# Patient Record
Sex: MALE | Race: WHITE | NOT HISPANIC OR LATINO | Employment: FULL TIME | URBAN - METROPOLITAN AREA
[De-identification: names, ages, dates, MRNs, and addresses within clinical notes are randomized per-mention and may not be internally consistent; named-entity substitution may affect disease eponyms.]

---

## 2019-01-07 ENCOUNTER — APPOINTMENT (EMERGENCY)
Dept: RADIOLOGY | Facility: HOSPITAL | Age: 29
End: 2019-01-07
Payer: COMMERCIAL

## 2019-01-07 ENCOUNTER — HOSPITAL ENCOUNTER (EMERGENCY)
Facility: HOSPITAL | Age: 29
Discharge: HOME | End: 2019-01-07
Attending: EMERGENCY MEDICINE
Payer: COMMERCIAL

## 2019-01-07 VITALS
HEART RATE: 101 BPM | TEMPERATURE: 99.1 F | RESPIRATION RATE: 14 BRPM | OXYGEN SATURATION: 99 % | WEIGHT: 245.81 LBS | BODY MASS INDEX: 33.29 KG/M2 | HEIGHT: 72 IN | DIASTOLIC BLOOD PRESSURE: 86 MMHG | SYSTOLIC BLOOD PRESSURE: 155 MMHG

## 2019-01-07 DIAGNOSIS — R07.9 CHEST PAIN: Primary | ICD-10-CM

## 2019-01-07 LAB
ALBUMIN SERPL BCP-MCNC: 4.2 G/DL (ref 3.5–5)
ALP SERPL-CCNC: 79 U/L (ref 46–116)
ALT SERPL W P-5'-P-CCNC: 77 U/L (ref 12–78)
ANION GAP SERPL CALCULATED.3IONS-SCNC: 8 MMOL/L (ref 4–13)
AST SERPL W P-5'-P-CCNC: 31 U/L (ref 5–45)
ATRIAL RATE: 100 BPM
BASOPHILS # BLD AUTO: 0.03 THOUSANDS/ΜL (ref 0–0.1)
BASOPHILS NFR BLD AUTO: 0 % (ref 0–1)
BILIRUB DIRECT SERPL-MCNC: 0.11 MG/DL (ref 0–0.2)
BILIRUB SERPL-MCNC: 0.6 MG/DL (ref 0.2–1)
BUN SERPL-MCNC: 11 MG/DL (ref 5–25)
CALCIUM SERPL-MCNC: 9.4 MG/DL (ref 8.3–10.1)
CHLORIDE SERPL-SCNC: 103 MMOL/L (ref 100–108)
CO2 SERPL-SCNC: 30 MMOL/L (ref 21–32)
CREAT SERPL-MCNC: 1.12 MG/DL (ref 0.6–1.3)
EOSINOPHIL # BLD AUTO: 0.14 THOUSAND/ΜL (ref 0–0.61)
EOSINOPHIL NFR BLD AUTO: 2 % (ref 0–6)
ERYTHROCYTE [DISTWIDTH] IN BLOOD BY AUTOMATED COUNT: 12.2 % (ref 11.6–15.1)
GFR SERPL CREATININE-BSD FRML MDRD: 90 ML/MIN/1.73SQ M
GLUCOSE SERPL-MCNC: 77 MG/DL (ref 65–140)
HCT VFR BLD AUTO: 44.5 % (ref 36.5–49.3)
HGB BLD-MCNC: 15.7 G/DL (ref 12–17)
IMM GRANULOCYTES # BLD AUTO: 0.02 THOUSAND/UL (ref 0–0.2)
IMM GRANULOCYTES NFR BLD AUTO: 0 % (ref 0–2)
LIPASE SERPL-CCNC: 98 U/L (ref 73–393)
LYMPHOCYTES # BLD AUTO: 3.9 THOUSANDS/ΜL (ref 0.6–4.47)
LYMPHOCYTES NFR BLD AUTO: 43 % (ref 14–44)
MCH RBC QN AUTO: 29.1 PG (ref 26.8–34.3)
MCHC RBC AUTO-ENTMCNC: 35.3 G/DL (ref 31.4–37.4)
MCV RBC AUTO: 82 FL (ref 82–98)
MONOCYTES # BLD AUTO: 0.89 THOUSAND/ΜL (ref 0.17–1.22)
MONOCYTES NFR BLD AUTO: 10 % (ref 4–12)
NEUTROPHILS # BLD AUTO: 4.03 THOUSANDS/ΜL (ref 1.85–7.62)
NEUTS SEG NFR BLD AUTO: 45 % (ref 43–75)
NRBC BLD AUTO-RTO: 0 /100 WBCS
P AXIS: 56 DEGREES
PLATELET # BLD AUTO: 266 THOUSANDS/UL (ref 149–390)
PMV BLD AUTO: 9.5 FL (ref 8.9–12.7)
POTASSIUM SERPL-SCNC: 3.7 MMOL/L (ref 3.5–5.3)
PR INTERVAL: 140 MS
PROT SERPL-MCNC: 7.6 G/DL (ref 6.4–8.2)
QRS AXIS: 55 DEGREES
QRSD INTERVAL: 88 MS
QT INTERVAL: 334 MS
QTC INTERVAL: 430 MS
RBC # BLD AUTO: 5.4 MILLION/UL (ref 3.88–5.62)
SODIUM SERPL-SCNC: 141 MMOL/L (ref 136–145)
T WAVE AXIS: 19 DEGREES
TROPONIN I SERPL-MCNC: <0.02 NG/ML
VENTRICULAR RATE: 100 BPM
WBC # BLD AUTO: 9.01 THOUSAND/UL (ref 4.31–10.16)

## 2019-01-07 PROCEDURE — 93005 ELECTROCARDIOGRAM TRACING: CPT

## 2019-01-07 PROCEDURE — 80048 BASIC METABOLIC PNL TOTAL CA: CPT | Performed by: EMERGENCY MEDICINE

## 2019-01-07 PROCEDURE — 83690 ASSAY OF LIPASE: CPT | Performed by: EMERGENCY MEDICINE

## 2019-01-07 PROCEDURE — 80076 HEPATIC FUNCTION PANEL: CPT | Performed by: EMERGENCY MEDICINE

## 2019-01-07 PROCEDURE — 85025 COMPLETE CBC W/AUTO DIFF WBC: CPT | Performed by: EMERGENCY MEDICINE

## 2019-01-07 PROCEDURE — 99285 EMERGENCY DEPT VISIT HI MDM: CPT

## 2019-01-07 PROCEDURE — 84484 ASSAY OF TROPONIN QUANT: CPT | Performed by: EMERGENCY MEDICINE

## 2019-01-07 PROCEDURE — 71046 X-RAY EXAM CHEST 2 VIEWS: CPT

## 2019-01-07 PROCEDURE — 36415 COLL VENOUS BLD VENIPUNCTURE: CPT | Performed by: EMERGENCY MEDICINE

## 2019-01-07 PROCEDURE — 93010 ELECTROCARDIOGRAM REPORT: CPT | Performed by: INTERNAL MEDICINE

## 2019-01-07 RX ORDER — OMEPRAZOLE 20 MG/1
20 CAPSULE, DELAYED RELEASE ORAL EVERY OTHER DAY
COMMUNITY

## 2019-01-08 NOTE — ED PROVIDER NOTES
History  Chief Complaint   Patient presents with    Chest Pain     Pt c/o intermittent dull aching L chest pain pain without radiation since this am, with numbness and tingling to L arm  Denies headache, dizziness, nausea, changes in vision, or diaphoresis  HPI  27-year-old male presents with left-sided chest pain that started at about 630 this morning  States that the pain is constant, non radiating  Did have mild tingling in left hand which resolved  Denies any trauma  No shortness of breath  No history of DVT/PE  No leg swelling no history of cancer  No long car rides  Denies any hypertension, hyperlipidemia, diabetes  Does have a grandfather who had MI in 46s  Prior to Admission Medications   Prescriptions Last Dose Informant Patient Reported? Taking?   omeprazole (PriLOSEC) 20 mg delayed release capsule   Yes Yes   Sig: Take 20 mg by mouth every other day      Facility-Administered Medications: None       Past Medical History:   Diagnosis Date    GERD (gastroesophageal reflux disease)     Lumbar herniated disc     x 3       History reviewed  No pertinent surgical history  History reviewed  No pertinent family history  I have reviewed and agree with the history as documented  Social History   Substance Use Topics    Smoking status: Never Smoker    Smokeless tobacco: Current User     Types: Chew    Alcohol use No        Review of Systems   Constitutional: Negative for chills and fever  HENT: Negative for dental problem and ear pain  Eyes: Negative for pain and redness  Respiratory: Negative for cough and shortness of breath  Cardiovascular: Positive for chest pain  Negative for palpitations  Gastrointestinal: Negative for abdominal pain and nausea  Endocrine: Negative for polydipsia and polyphagia  Genitourinary: Negative for dysuria and frequency  Musculoskeletal: Negative for arthralgias and joint swelling  Skin: Negative for color change and rash     Neurological: Negative for dizziness and headaches  Psychiatric/Behavioral: Negative for behavioral problems and confusion  All other systems reviewed and are negative  Physical Exam  Physical Exam   Constitutional: He is oriented to person, place, and time  He appears well-developed and well-nourished  No distress  HENT:   Head: Atraumatic  Right Ear: External ear normal    Left Ear: External ear normal    Nose: Nose normal    Eyes: Pupils are equal, round, and reactive to light  Conjunctivae and EOM are normal    Neck: Normal range of motion  Neck supple  No JVD present  Cardiovascular: Normal rate, regular rhythm and normal heart sounds  No murmur heard  Pulmonary/Chest: Effort normal and breath sounds normal  No respiratory distress  He has no wheezes  Abdominal: Soft  Bowel sounds are normal  He exhibits no distension  There is no tenderness  Musculoskeletal: Normal range of motion  He exhibits no edema  Neurological: He is alert and oriented to person, place, and time  No cranial nerve deficit  Skin: Skin is warm and dry  Capillary refill takes less than 2 seconds  He is not diaphoretic  Psychiatric: He has a normal mood and affect  His behavior is normal    Nursing note and vitals reviewed        Vital Signs  ED Triage Vitals [01/07/19 2007]   Temperature Pulse Respirations Blood Pressure SpO2   99 1 °F (37 3 °C) 101 14 155/86 99 %      Temp Source Heart Rate Source Patient Position - Orthostatic VS BP Location FiO2 (%)   Oral Monitor Sitting Right arm --      Pain Score       5           Vitals:    01/07/19 2007   BP: 155/86   Pulse: 101   Patient Position - Orthostatic VS: Sitting       Visual Acuity      ED Medications  Medications - No data to display    Diagnostic Studies  Results Reviewed     Procedure Component Value Units Date/Time    Troponin I [[id number]]  (Normal) Collected:  01/07/19 2137    Lab Status:  Final result Specimen:  Blood from Arm, Right Updated:  01/07/19 2203 Troponin I <0 02 ng/mL     Basic metabolic panel [[id number]] Collected:  01/07/19 2014    Lab Status:  Final result Specimen:  Blood from Arm, Right Updated:  01/07/19 2044     Sodium 141 mmol/L      Potassium 3 7 mmol/L      Chloride 103 mmol/L      CO2 30 mmol/L      ANION GAP 8 mmol/L      BUN 11 mg/dL      Creatinine 1 12 mg/dL      Glucose 77 mg/dL      Calcium 9 4 mg/dL      eGFR 90 ml/min/1 73sq m     Narrative:         National Kidney Disease Education Program recommendations are as follows:  GFR calculation is accurate only with a steady state creatinine  Chronic Kidney disease less than 60 ml/min/1 73 sq  meters  Kidney failure less than 15 ml/min/1 73 sq  meters      Hepatic function panel [[id number]]  (Normal) Collected:  01/07/19 2014    Lab Status:  Final result Specimen:  Blood from Arm, Right Updated:  01/07/19 2044     Total Bilirubin 0 60 mg/dL      Bilirubin, Direct 0 11 mg/dL      Alkaline Phosphatase 79 U/L      AST 31 U/L      ALT 77 U/L      Total Protein 7 6 g/dL      Albumin 4 2 g/dL     Lipase [[id number]]  (Normal) Collected:  01/07/19 2014    Lab Status:  Final result Specimen:  Blood from Arm, Right Updated:  01/07/19 2044     Lipase 98 u/L     CBC and differential [[id number]] Collected:  01/07/19 2014    Lab Status:  Final result Specimen:  Blood from Arm, Right Updated:  01/07/19 2020     WBC 9 01 Thousand/uL      RBC 5 40 Million/uL      Hemoglobin 15 7 g/dL      Hematocrit 44 5 %      MCV 82 fL      MCH 29 1 pg      MCHC 35 3 g/dL      RDW 12 2 %      MPV 9 5 fL      Platelets 245 Thousands/uL      nRBC 0 /100 WBCs      Neutrophils Relative 45 %      Immat GRANS % 0 %      Lymphocytes Relative 43 %      Monocytes Relative 10 %      Eosinophils Relative 2 %      Basophils Relative 0 %      Neutrophils Absolute 4 03 Thousands/µL      Immature Grans Absolute 0 02 Thousand/uL      Lymphocytes Absolute 3 90 Thousands/µL      Monocytes Absolute 0 89 Thousand/µL      Eosinophils Absolute 0 14 Thousand/µL      Basophils Absolute 0 03 Thousands/µL                  XR chest 2 views   Final Result by Vijay Byrd MD (01/07 2109)      No acute cardiopulmonary disease  Workstation performed: [id number]                    Procedures  Procedures       Phone Contacts  ED Phone Contact    ED Course         HEART Risk Score      Most Recent Value   History  0 Filed at: 01/07/2019 2240   ECG  0 Filed at: 01/07/2019 2240   Age  0 Filed at: 01/07/2019 2240   Risk Factors  1 Filed at: 01/07/2019 2240   Troponin  0 Filed at: 01/07/2019 2240   Heart Score Risk Calculator   History  0 Filed at: 01/07/2019 2240   ECG  0 Filed at: 01/07/2019 2240   Age  0 Filed at: 01/07/2019 2240   Risk Factors  1 Filed at: 01/07/2019 2240   Troponin  0 Filed at: 01/07/2019 2240   HEART Score  1 Filed at: 01/07/2019 2240   HEART Score  1 Filed at: 01/07/2019 2240                            [facility]  Number of Diagnoses or Management Options  Chest pain:   Diagnosis management comments: 32 yo M presents with constant chest pain for the last 12+ hours, heart score of 1, EKG without ischemia, trop negative, CXR without abnormality, labs otherwise normal, doubt ACS especially with constant pain over the last many hours with negative trop, recommend pcp follow up for stress testing as indicated       CritCare Time    Disposition  Final diagnoses:   Chest pain     Time reflects when diagnosis was documented in both MDM as applicable and the Disposition within this note     Time User Action Codes Description Comment    1/7/2019 10:13 PM Justina Showers Add [R07 9] Chest pain       ED Disposition     ED Disposition Condition Comment    Discharge  [place] discharge to home/self care  Condition at discharge: Good        Follow-up Information     Follow up With Specialties Details Why Contact Info    Ksenia Richardson MD Internal Medicine Schedule an appointment as soon as possible for a visit  [facility]  [address]  [address] [place]  [place] [address] [phone]            Patient's Medications   Discharge Prescriptions    No medications on file     No discharge procedures on file      ED Provider  Electronically Signed by           Alexandre Payne MD  01/07/19 4827

## 2019-01-08 NOTE — ED NOTES
Patient transported to [address] [place] [address] Erlin Yi, [address] [place] [address]  01/07/19 2052

## 2019-01-08 NOTE — DISCHARGE INSTRUCTIONS
Determining the exact cause for all patients with chest pain is extremely difficult in the emergency department  Our primary focus is to rule-out immediate life-threatening diseases  If this cannot be done, the definitive diagnosis frequently needs to be determined over time  Sometimes, specialist are required cardiologists, pulmonologists, gastroenterologists or surgeons  Many times the primary care physcians can determine the cause by following the symptoms over time  Please return immediately to the Emergency Department for fever>101, vomiting, shortness of breath, worsening symptoms, pain with exertion, passing out, intractable pain or any other concerns

## 2021-01-25 ENCOUNTER — TRANSCRIBE ORDERS (OUTPATIENT)
Dept: ADMINISTRATIVE | Facility: HOSPITAL | Age: 31
End: 2021-01-25

## 2021-01-25 DIAGNOSIS — R07.89 CHEST DISCOMFORT: ICD-10-CM

## 2021-01-25 DIAGNOSIS — R20.0 ANESTHESIA OF SKIN: Primary | ICD-10-CM

## 2021-02-11 ENCOUNTER — HOSPITAL ENCOUNTER (OUTPATIENT)
Dept: NON INVASIVE DIAGNOSTICS | Facility: CLINIC | Age: 31
Discharge: HOME | End: 2021-02-11
Payer: COMMERCIAL

## 2021-02-11 DIAGNOSIS — R07.89 CHEST DISCOMFORT: ICD-10-CM

## 2021-02-11 LAB
ARRHY DURING EX: NORMAL
CHEST PAIN STATEMENT: NORMAL
MAX DIASTOLIC BP: 82 MMHG
MAX HEART RATE: 181 BPM
MAX PREDICTED HEART RATE: 191 BPM
MAX. SYSTOLIC BP: 180 MMHG
PROTOCOL NAME: NORMAL
REASON FOR TERMINATION: NORMAL
TARGET HR FORMULA: NORMAL
TEST INDICATION: NORMAL
TIME IN EXERCISE PHASE: NORMAL

## 2021-02-11 PROCEDURE — 93017 CV STRESS TEST TRACING ONLY: CPT

## 2021-02-11 PROCEDURE — 93016 CV STRESS TEST SUPVJ ONLY: CPT | Performed by: INTERNAL MEDICINE

## 2021-02-11 PROCEDURE — 93018 CV STRESS TEST I&R ONLY: CPT | Performed by: INTERNAL MEDICINE

## 2023-06-13 ENCOUNTER — APPOINTMENT (EMERGENCY)
Dept: CT IMAGING | Facility: HOSPITAL | Age: 33
End: 2023-06-13
Payer: COMMERCIAL

## 2023-06-13 ENCOUNTER — HOSPITAL ENCOUNTER (EMERGENCY)
Facility: HOSPITAL | Age: 33
Discharge: HOME | End: 2023-06-13
Attending: EMERGENCY MEDICINE
Payer: COMMERCIAL

## 2023-06-13 VITALS
RESPIRATION RATE: 16 BRPM | WEIGHT: 240.96 LBS | HEART RATE: 103 BPM | OXYGEN SATURATION: 96 % | TEMPERATURE: 97.4 F | BODY MASS INDEX: 32.68 KG/M2 | SYSTOLIC BLOOD PRESSURE: 136 MMHG | DIASTOLIC BLOOD PRESSURE: 89 MMHG

## 2023-06-13 DIAGNOSIS — K52.9 GASTROENTERITIS: Primary | ICD-10-CM

## 2023-06-13 LAB
ALBUMIN SERPL BCP-MCNC: 5.5 G/DL (ref 3.5–5)
ALP SERPL-CCNC: 73 U/L (ref 34–104)
ALT SERPL W P-5'-P-CCNC: 66 U/L (ref 7–52)
ANION GAP SERPL CALCULATED.3IONS-SCNC: 16 MMOL/L (ref 4–13)
AST SERPL W P-5'-P-CCNC: 34 U/L (ref 13–39)
BASOPHILS # BLD AUTO: 0.03 THOUSANDS/ÂΜL (ref 0–0.1)
BASOPHILS NFR BLD AUTO: 0 % (ref 0–1)
BILIRUB SERPL-MCNC: 1.22 MG/DL (ref 0.2–1)
BUN SERPL-MCNC: 26 MG/DL (ref 5–25)
CALCIUM SERPL-MCNC: 10.1 MG/DL (ref 8.4–10.2)
CHLORIDE SERPL-SCNC: 98 MMOL/L (ref 96–108)
CO2 SERPL-SCNC: 20 MMOL/L (ref 21–32)
CREAT SERPL-MCNC: 1.33 MG/DL (ref 0.6–1.3)
EOSINOPHIL # BLD AUTO: 0.02 THOUSAND/ÂΜL (ref 0–0.61)
EOSINOPHIL NFR BLD AUTO: 0 % (ref 0–6)
ERYTHROCYTE [DISTWIDTH] IN BLOOD BY AUTOMATED COUNT: 12.2 % (ref 11.6–15.1)
GFR SERPL CREATININE-BSD FRML MDRD: 70 ML/MIN/1.73SQ M
GLUCOSE SERPL-MCNC: 105 MG/DL (ref 65–140)
HCT VFR BLD AUTO: 48 % (ref 36.5–49.3)
HGB BLD-MCNC: 17.4 G/DL (ref 12–17)
IMM GRANULOCYTES # BLD AUTO: 0.05 THOUSAND/UL (ref 0–0.2)
IMM GRANULOCYTES NFR BLD AUTO: 1 % (ref 0–2)
LIPASE SERPL-CCNC: 14 U/L (ref 11–82)
LYMPHOCYTES # BLD AUTO: 1.22 THOUSANDS/ÂΜL (ref 0.6–4.47)
LYMPHOCYTES NFR BLD AUTO: 13 % (ref 14–44)
MCH RBC QN AUTO: 29.6 PG (ref 26.8–34.3)
MCHC RBC AUTO-ENTMCNC: 36.3 G/DL (ref 31.4–37.4)
MCV RBC AUTO: 82 FL (ref 82–98)
MONOCYTES # BLD AUTO: 0.88 THOUSAND/ÂΜL (ref 0.17–1.22)
MONOCYTES NFR BLD AUTO: 10 % (ref 4–12)
NEUTROPHILS # BLD AUTO: 6.93 THOUSANDS/ÂΜL (ref 1.85–7.62)
NEUTS SEG NFR BLD AUTO: 76 % (ref 43–75)
NRBC BLD AUTO-RTO: 0 /100 WBCS
PLATELET # BLD AUTO: 274 THOUSANDS/UL (ref 149–390)
PMV BLD AUTO: 10 FL (ref 8.9–12.7)
POTASSIUM SERPL-SCNC: 3.6 MMOL/L (ref 3.5–5.3)
PROT SERPL-MCNC: 8.8 G/DL (ref 6.4–8.4)
RBC # BLD AUTO: 5.87 MILLION/UL (ref 3.88–5.62)
SODIUM SERPL-SCNC: 134 MMOL/L (ref 135–147)
WBC # BLD AUTO: 9.13 THOUSAND/UL (ref 4.31–10.16)

## 2023-06-13 PROCEDURE — 85025 COMPLETE CBC W/AUTO DIFF WBC: CPT

## 2023-06-13 PROCEDURE — 83690 ASSAY OF LIPASE: CPT

## 2023-06-13 PROCEDURE — 74177 CT ABD & PELVIS W/CONTRAST: CPT

## 2023-06-13 PROCEDURE — 36415 COLL VENOUS BLD VENIPUNCTURE: CPT

## 2023-06-13 PROCEDURE — 80053 COMPREHEN METABOLIC PANEL: CPT

## 2023-06-13 RX ORDER — DICYCLOMINE HCL 20 MG
20 TABLET ORAL ONCE
Status: COMPLETED | OUTPATIENT
Start: 2023-06-13 | End: 2023-06-13

## 2023-06-13 RX ORDER — ONDANSETRON 4 MG/1
4 TABLET, ORALLY DISINTEGRATING ORAL EVERY 6 HOURS PRN
Qty: 20 TABLET | Refills: 0 | Status: SHIPPED | OUTPATIENT
Start: 2023-06-13

## 2023-06-13 RX ORDER — ONDANSETRON 2 MG/ML
4 INJECTION INTRAMUSCULAR; INTRAVENOUS ONCE
Status: COMPLETED | OUTPATIENT
Start: 2023-06-13 | End: 2023-06-13

## 2023-06-13 RX ORDER — DICYCLOMINE HCL 20 MG
20 TABLET ORAL 2 TIMES DAILY
Qty: 20 TABLET | Refills: 0 | Status: SHIPPED | OUTPATIENT
Start: 2023-06-13

## 2023-06-13 RX ADMIN — DICYCLOMINE HYDROCHLORIDE 20 MG: 20 TABLET ORAL at 11:38

## 2023-06-13 RX ADMIN — ONDANSETRON 4 MG: 2 INJECTION INTRAMUSCULAR; INTRAVENOUS at 11:06

## 2023-06-13 RX ADMIN — SODIUM CHLORIDE 1000 ML: 0.9 INJECTION, SOLUTION INTRAVENOUS at 11:25

## 2023-06-13 RX ADMIN — IOHEXOL 100 ML: 350 INJECTION, SOLUTION INTRAVENOUS at 11:53

## 2023-06-13 NOTE — ED PROVIDER NOTES
History  Chief Complaint   Patient presents with   • Abdominal Pain     Pt c/o abd pain , vomiting and diarrhea since 0300 monday morning; pt also c/o body aches  Pt states having  hot sauce that night before  60-year-old male presents to the ER for evaluation of abdominal pain  Patient stated that on Monday morning around 3 AM he was awoken suddenly with the urge to have a bowel movement and vomit  For the last 2 days he has been having multiple episodes of vomiting, nonbloody nonbilious  Diarrhea is described as brown and loose, nonbloody  Abdominal pain is diffuse and described as cramping and aching  Pain is a 6-7 out of 10 pain  Patient has unable to keep food or liquids down  Patient reports having eaten  hot sauce around 7 PM the night before  Hot sauce does not have preservatives and is a natural homemade hot sauce  Patient denies trauma, sick contacts, previous abdominal surgeries, recent travel  Patient also reports body aches, weakness, fatigue  Denies chest pain, shortness of breath, urinary symptoms, flank pain, rash or fever  History provided by:  Patient  Abdominal Pain  Pain location:  Generalized  Pain quality: aching and cramping    Pain radiates to:  Does not radiate  Pain severity:  Moderate  Duration:  2 days  Timing:  Constant  Progression:  Unchanged  Context: suspicious food intake    Context: not previous surgeries, not recent travel, not sick contacts and not trauma    Associated symptoms: diarrhea, nausea and vomiting    Associated symptoms: no chest pain, no chills, no cough, no dysuria, no fever and no shortness of breath    Risk factors: has not had multiple surgeries        Prior to Admission Medications   Prescriptions Last Dose Informant Patient Reported?  Taking?   omeprazole (PriLOSEC) 20 mg delayed release capsule   Yes No   Sig: Take 20 mg by mouth every other day      Facility-Administered Medications: None       Past Medical History: Diagnosis Date   • GERD (gastroesophageal reflux disease)    • Hypertension    • Lumbar herniated disc     x 3       History reviewed  No pertinent surgical history  History reviewed  No pertinent family history  I have reviewed and agree with the history as documented  E-Cigarette/Vaping     E-Cigarette/Vaping Substances     Social History     Tobacco Use   • Smoking status: Never   • Smokeless tobacco: Current     Types: Chew   Substance Use Topics   • Alcohol use: No   • Drug use: No       Review of Systems   Constitutional: Negative for chills and fever  Respiratory: Negative for cough, shortness of breath and wheezing  Cardiovascular: Negative for chest pain and palpitations  Gastrointestinal: Positive for abdominal pain, diarrhea, nausea and vomiting  Genitourinary: Negative for dysuria  Musculoskeletal: Positive for myalgias  Skin: Negative for rash and wound  Neurological: Negative for dizziness, tremors, seizures, syncope, numbness and headaches  Physical Exam  Physical Exam  Vitals and nursing note reviewed  Constitutional:       General: He is not in acute distress  Appearance: He is well-developed  HENT:      Head: Normocephalic and atraumatic  Eyes:      Conjunctiva/sclera: Conjunctivae normal    Cardiovascular:      Rate and Rhythm: Normal rate and regular rhythm  Heart sounds: Normal heart sounds  No murmur heard  Pulmonary:      Effort: Pulmonary effort is normal  No respiratory distress  Breath sounds: Normal breath sounds  Abdominal:      General: Abdomen is flat  Bowel sounds are normal       Palpations: Abdomen is soft  Tenderness: There is no abdominal tenderness  Hernia: No hernia is present  Musculoskeletal:         General: No swelling  Cervical back: Neck supple  Skin:     General: Skin is warm and dry  Capillary Refill: Capillary refill takes less than 2 seconds     Neurological:      Mental Status: He is alert and oriented to person, place, and time     Psychiatric:         Mood and Affect: Mood normal          Vital Signs  ED Triage Vitals [06/13/23 1047]   Temperature Pulse Respirations Blood Pressure SpO2   (!) 97 4 °F (36 3 °C) 98 16 125/87 97 %      Temp Source Heart Rate Source Patient Position - Orthostatic VS BP Location FiO2 (%)   Oral Monitor Sitting Right arm --      Pain Score       --           Vitals:    06/13/23 1047 06/13/23 1230   BP: 125/87 136/89   Pulse: 98 103   Patient Position - Orthostatic VS: Sitting Sitting         Visual Acuity      ED Medications  Medications   ondansetron (ZOFRAN) injection 4 mg (4 mg Intravenous Given 6/13/23 1106)   sodium chloride 0 9 % bolus 1,000 mL (0 mL Intravenous Stopped 6/13/23 1225)   dicyclomine (BENTYL) tablet 20 mg (20 mg Oral Given 6/13/23 1138)   iohexol (OMNIPAQUE) 350 MG/ML injection (SINGLE-DOSE) 100 mL (100 mL Intravenous Given 6/13/23 1153)       Diagnostic Studies  Results Reviewed     Procedure Component Value Units Date/Time    CMP [[id number]]  (Abnormal) Collected: 06/13/23 1106    Lab Status: Final result Specimen: Blood from Arm, Right Updated: 06/13/23 1144     Sodium 134 mmol/L      Potassium 3 6 mmol/L      Chloride 98 mmol/L      CO2 20 mmol/L      ANION GAP 16 mmol/L      BUN 26 mg/dL      Creatinine 1 33 mg/dL      Glucose 105 mg/dL      Calcium 10 1 mg/dL      AST 34 U/L      ALT 66 U/L      Alkaline Phosphatase 73 U/L      Total Protein 8 8 g/dL      Albumin 5 5 g/dL      Total Bilirubin 1 22 mg/dL      eGFR 70 ml/min/1 73sq m     Narrative:      Cari guidelines for Chronic Kidney Disease (CKD):   •  Stage 1 with normal or high GFR (GFR > 90 mL/min/1 73 square meters)  •  Stage 2 Mild CKD (GFR = 60-89 mL/min/1 73 square meters)  •  Stage 3A Moderate CKD (GFR = 45-59 mL/min/1 73 square meters)  •  Stage 3B Moderate CKD (GFR = 30-44 mL/min/1 73 square meters)  •  Stage 4 Severe CKD (GFR = 15-29 mL/min/1 73 square meters)  •  Stage 5 End Stage CKD (GFR <15 mL/min/1 73 square meters)  Note: GFR calculation is accurate only with a steady state creatinine    Lipase [[id number]]  (Normal) Collected: 06/13/23 1106    Lab Status: Final result Specimen: Blood from Arm, Right Updated: 06/13/23 1144     Lipase 14 u/L     CBC and differential [[id number]]  (Abnormal) Collected: 06/13/23 1106    Lab Status: Final result Specimen: Blood from Arm, Right Updated: 06/13/23 1122     WBC 9 13 Thousand/uL      RBC 5 87 Million/uL      Hemoglobin 17 4 g/dL      Hematocrit 48 0 %      MCV 82 fL      MCH 29 6 pg      MCHC 36 3 g/dL      RDW 12 2 %      MPV 10 0 fL      Platelets 449 Thousands/uL      nRBC 0 /100 WBCs      Neutrophils Relative 76 %      Immat GRANS % 1 %      Lymphocytes Relative 13 %      Monocytes Relative 10 %      Eosinophils Relative 0 %      Basophils Relative 0 %      Neutrophils Absolute 6 93 Thousands/µL      Immature Grans Absolute 0 05 Thousand/uL      Lymphocytes Absolute 1 22 Thousands/µL      Monocytes Absolute 0 88 Thousand/µL      Eosinophils Absolute 0 02 Thousand/µL      Basophils Absolute 0 03 Thousands/µL     UA (URINE) with reflex to Scope [[id number]]     Lab Status: No result Specimen: Urine                  CT abdomen pelvis with contrast   Final Result by Kelesy De La Fuente DO (06/13 1322)      1  Multiple fluid-filled loops of small bowel with mild wall thickening suggestive of gastroenteritis  There is also fluid seen within several portions of the colonic lumen suggestive of diarrheal illness  Otherwise, no CT evidence of acute process    within the abdomen or pelvis  Workstation performed: [id number]                    Procedures  Procedures         ED Course                               SBIRT 22yo+    Flowsheet Row Most Recent Value   Initial Alcohol Screen: US AUDIT-C     1  How often do you have a drink containing alcohol? 1 Filed at: 06/13/2023 1250   2   How many drinks containing alcohol do you have on a typical day you are drinking? 0 Filed at: 06/13/2023 1250   3a  Male UNDER 65: How often do you have five or more drinks on one occasion? 0 Filed at: 06/13/2023 1250   3b  FEMALE Any Age, or MALE 65+: How often do you have 4 or more drinks on one occassion? 0 Filed at: 06/13/2023 1250   Audit-C Score 1 Filed at: 06/13/2023 1250   JOSEFINA: How many times in the past year have you    Used an illegal drug or used a prescription medication for non-medical reasons? Never Filed at: 06/13/2023 1250                    Medical Decision Making  80-year-old male presents to the ER for evaluation of abdominal pain  This patient presents with nausea, vomiting, diarrhea differentials include possible acute gastroenteritis  Abdominal exam without peritoneal signs  Doubt invasive bacterial causing diarrhea such as C  Diff-no recent antibiotics, Shiga toxin-nonbloody  No recent travel  Patient is not immunocompromise  Diarrhea is nonbloody so less likely inflammatory bowel disease  No evidence of surgical abdomen or acute medical emergency including bowel obstruction, perforation, atypical appendicitis, acute cholecystiti or diverticulitis  Patient given Bentyl and Zofran with fluids and had pain relief  Prescription sent to pharmacy for Bentyl and Zofran  Advised patient to increase fluid intake  Referral to GI given follow-up with primary care provider  Return to the ER if symptoms worsen or questions or concerns arise at home  Amount and/or Complexity of Data Reviewed  Labs: ordered  Radiology: ordered  Risk  Prescription drug management            Disposition  Final diagnoses:   Gastroenteritis     Time reflects when diagnosis was documented in both MDM as applicable and the Disposition within this note     Time User Action Codes Description Comment    6/13/2023  1:38 PM Isaiah Ruggiero Add [K52 9] Gastroenteritis       ED Disposition     ED Disposition   Discharge    Condition   Stable Date/Time   Tue Jun 13, 2023  1:38 PM    Comment   Jimi Giron discharge to home/self care  Follow-up Information     Follow up With Specialties Details Why Contact Info Additional Information    Moshe Carreon MD Internal Medicine   [address] [facility]  [address]  [address] [place] [address]  [address] [facility]   [address] [phone]  Javier Alonsochano [address] [facility], [address] [facility], [facility], [place], [place], [address]           Discharge Medication List as of 6/13/2023  1:40 PM      START taking these medications    Details   dicyclomine (BENTYL) 20 mg tablet Take 1 tablet (20 mg total) by mouth 2 (two) times a day, Starting Tue 6/13/2023, Normal      ondansetron (ZOFRAN-ODT) 4 mg disintegrating tablet Take 1 tablet (4 mg total) by mouth every 6 (six) hours as needed for nausea or vomiting, Starting Tue 6/13/2023, Normal         CONTINUE these medications which have NOT CHANGED    Details   omeprazole (PriLOSEC) 20 mg delayed release capsule Take 20 mg by mouth every other day, Historical Med             No discharge procedures on file      PDMP Review     None          ED Provider  Electronically Signed by           EVANS Garcia  06/13/23 6043

## 2023-06-13 NOTE — Clinical Note
Santana Ponce was seen and treated in our emergency department on 6/13/2023  Diagnosis:     Israel Rod  may return to work on return date  He may return on this date: 06/15/2023         If you have any questions or concerns, please don't hesitate to call        Tereso Nation    ______________________________           _______________          _______________  Hospital Representative                              Date                                Time

## 2023-06-13 NOTE — ED ATTENDING ATTESTATION
2023  IKarla DO, saw and evaluated the patient  I have discussed the patient with the resident/non-physician practitioner and agree with the resident's/non-physician practitioner's findings, Plan of Care, and MDM as documented in the resident's/non-physician practitioner's note, except where noted  All available labs and Radiology studies were reviewed  I was present for key portions of any procedure(s) performed by the resident/non-physician practitioner and I was immediately available to provide assistance  At this point I agree with the current assessment done in the Emergency Department  I have conducted an independent evaluation of this patient a history and physical is as follows:    ED Course     32 y/o male c/o n/v/d x 1 day  Woke him up from sleeping at 3a  nonbloody diarrhea- multiple episodes  Multiple nbnb emesis  Diffuse abd pain- cramping/ dull  Has not tried anything  Thinks he may have food poisoning from  hot sauce  No one else with similar  No other complaints  General: VSS, NAD, awake, alert  Well-nourished, well-developed  Appears stated age  Speaking normally in full sentences  Head: Normocephalic, atraumatic, nontender  Eyes: PERRL, EOM-I  No diplopia  No hyphema  No subconjunctival hemorrhages  Symmetrical lids  ENT: Atraumatic external nose and ears  MMM  No malocclusion  No stridor  Normal phonation  No drooling  Normal swallowing  Neck: Symmetric, trachea midline  No JVD  CV: RRR  +S1/S2  No murmurs or gallops  Peripheral pulses +2 throughout  No chest wall tenderness  Lungs:   Unlabored No retractions  CTAB, lungs sounds equal bilateral    No tachypnea  Abd: +BS, soft, NT/ND    MSK:   FROM   Back:   No rashes  Skin: Dry, intact  Neuro: AAOx3, GCS 15, CN II-XII grossly intact  Motor grossly intact    Psychiatric/Behavioral: Appropriate mood and affect   Exam: deferred  Plan: labs, ct scan, zofran, bentyl, fluids     Critical Care Time  Procedures

## 2023-07-14 ENCOUNTER — HOSPITAL ENCOUNTER (EMERGENCY)
Facility: HOSPITAL | Age: 33
Discharge: HOME | End: 2023-07-15
Attending: EMERGENCY MEDICINE
Payer: OTHER MISCELLANEOUS

## 2023-07-14 ENCOUNTER — APPOINTMENT (EMERGENCY)
Dept: RADIOLOGY | Facility: HOSPITAL | Age: 33
End: 2023-07-14
Payer: OTHER MISCELLANEOUS

## 2023-07-14 VITALS
DIASTOLIC BLOOD PRESSURE: 78 MMHG | RESPIRATION RATE: 20 BRPM | OXYGEN SATURATION: 99 % | SYSTOLIC BLOOD PRESSURE: 132 MMHG | HEART RATE: 99 BPM | TEMPERATURE: 98.3 F

## 2023-07-14 DIAGNOSIS — R42 LIGHTHEADEDNESS: Primary | ICD-10-CM

## 2023-07-14 PROCEDURE — 80053 COMPREHEN METABOLIC PANEL: CPT | Performed by: EMERGENCY MEDICINE

## 2023-07-14 PROCEDURE — 85025 COMPLETE CBC W/AUTO DIFF WBC: CPT | Performed by: EMERGENCY MEDICINE

## 2023-07-14 PROCEDURE — 93005 ELECTROCARDIOGRAM TRACING: CPT

## 2023-07-14 PROCEDURE — 36415 COLL VENOUS BLD VENIPUNCTURE: CPT

## 2023-07-14 PROCEDURE — 71045 X-RAY EXAM CHEST 1 VIEW: CPT

## 2023-07-14 PROCEDURE — 84484 ASSAY OF TROPONIN QUANT: CPT | Performed by: EMERGENCY MEDICINE

## 2023-07-14 PROCEDURE — 99284 EMERGENCY DEPT VISIT MOD MDM: CPT

## 2023-07-15 LAB
ALBUMIN SERPL BCP-MCNC: 4.6 G/DL (ref 3.5–5)
ALP SERPL-CCNC: 80 U/L (ref 34–104)
ALT SERPL W P-5'-P-CCNC: 50 U/L (ref 7–52)
ANION GAP SERPL CALCULATED.3IONS-SCNC: 10 MMOL/L
AST SERPL W P-5'-P-CCNC: 35 U/L (ref 13–39)
BASOPHILS # BLD AUTO: 0.03 THOUSANDS/ÂΜL (ref 0–0.1)
BASOPHILS NFR BLD AUTO: 0 % (ref 0–1)
BILIRUB SERPL-MCNC: 0.46 MG/DL (ref 0.2–1)
BUN SERPL-MCNC: 20 MG/DL (ref 5–25)
CALCIUM SERPL-MCNC: 9.4 MG/DL (ref 8.4–10.2)
CARDIAC TROPONIN I PNL SERPL HS: 3 NG/L
CHLORIDE SERPL-SCNC: 107 MMOL/L (ref 96–108)
CO2 SERPL-SCNC: 20 MMOL/L (ref 21–32)
CREAT SERPL-MCNC: 1.03 MG/DL (ref 0.6–1.3)
EOSINOPHIL # BLD AUTO: 0.12 THOUSAND/ÂΜL (ref 0–0.61)
EOSINOPHIL NFR BLD AUTO: 1 % (ref 0–6)
ERYTHROCYTE [DISTWIDTH] IN BLOOD BY AUTOMATED COUNT: 12.6 % (ref 11.6–15.1)
GAS + CO PNL BLDA: 1.1 % (ref 0–1.5)
GFR SERPL CREATININE-BSD FRML MDRD: 96 ML/MIN/1.73SQ M
GLUCOSE SERPL-MCNC: 135 MG/DL (ref 65–140)
HCT VFR BLD AUTO: 41.1 % (ref 36.5–49.3)
HGB BLD-MCNC: 14.8 G/DL (ref 12–17)
IMM GRANULOCYTES # BLD AUTO: 0.05 THOUSAND/UL (ref 0–0.2)
IMM GRANULOCYTES NFR BLD AUTO: 1 % (ref 0–2)
LYMPHOCYTES # BLD AUTO: 2.8 THOUSANDS/ÂΜL (ref 0.6–4.47)
LYMPHOCYTES NFR BLD AUTO: 29 % (ref 14–44)
MCH RBC QN AUTO: 30.2 PG (ref 26.8–34.3)
MCHC RBC AUTO-ENTMCNC: 36 G/DL (ref 31.4–37.4)
MCV RBC AUTO: 84 FL (ref 82–98)
MONOCYTES # BLD AUTO: 0.57 THOUSAND/ÂΜL (ref 0.17–1.22)
MONOCYTES NFR BLD AUTO: 6 % (ref 4–12)
NEUTROPHILS # BLD AUTO: 6.18 THOUSANDS/ÂΜL (ref 1.85–7.62)
NEUTS SEG NFR BLD AUTO: 63 % (ref 43–75)
NRBC BLD AUTO-RTO: 0 /100 WBCS
PLATELET # BLD AUTO: 251 THOUSANDS/UL (ref 149–390)
PMV BLD AUTO: 10.5 FL (ref 8.9–12.7)
POTASSIUM SERPL-SCNC: 4.7 MMOL/L (ref 3.5–5.3)
PROT SERPL-MCNC: 6.9 G/DL (ref 6.4–8.4)
RBC # BLD AUTO: 4.9 MILLION/UL (ref 3.88–5.62)
SODIUM SERPL-SCNC: 137 MMOL/L (ref 135–147)
WBC # BLD AUTO: 9.75 THOUSAND/UL (ref 4.31–10.16)

## 2023-07-15 PROCEDURE — 36415 COLL VENOUS BLD VENIPUNCTURE: CPT | Performed by: EMERGENCY MEDICINE

## 2023-07-15 PROCEDURE — 82375 ASSAY CARBOXYHB QUANT: CPT | Performed by: EMERGENCY MEDICINE

## 2023-07-15 NOTE — ED PROVIDER NOTES
History  Chief Complaint   Patient presents with   • Dizziness     Patient arrived via EMS, patient is a  and went to a call, pt was on top of a roof handling metal and other materials. Patient states after he got off he felt dizzy, vision felt "focused" and was seeing "black" tunneled vision. Patient received 700 CC saline during transit. . EMS reports systolic was around 98 before fluids. Mao Galarza) Mao Galarza is a 33 y.o. male who identifies as male    They presented to the emergency department on July 15, 2023. Patient presents with:  Dizziness: Patient arrived via EMS, patient is a  and went to a call, pt was on top of a roof handling metal and other materials. Patient states after he got off he felt dizzy, vision felt "focused" and was seeing "black" tunneled vision. Patient received 700 CC saline during transit. . EMS reports systolic was around 98 before fluids. The patient states that he works as a , went to a call at [employer], was wearing his respirator the entire time, however had to cut vent holes on the roof. Patient states that there were multiple layers of rubber, insulation, as well as metal that he was able to eventually complete and went back to the truck. While standing there he initially felt tingling sensation in his face. Patient notes that he has had this sensation previously when he feels as if he is exerting himself and not drinking of water. Patient states however that the tingling became overall numbness in his face then he developed tunnel vision, as well as muffled hearing. Patient called for nearby paramedic who help take him into a stretcher, and at that time noted that his blood pressure was low. Patient was given some fluids at that time, and was brought to the emergency department for evaluation.   Patient states that this episode lasted roughly about 10 minutes, and after they started fluids he began feeling much better. Patient did not have any focal weakness, difficulty speaking, palpitations, chest pain, shortness of breath, abdominal pain, nausea, vomiting, change in bowel habits, change in urination, loss of consciousness, or any other complaint at this time. Allergies include:  -- Penicillins -- Hives      Immunizations: There is no immunization history on file for this patient. Immunizations Reviewed. Prior to Admission Medications   Prescriptions Last Dose Informant Patient Reported? Taking?   dicyclomine (BENTYL) 20 mg tablet   No No   Sig: Take 1 tablet (20 mg total) by mouth 2 (two) times a day   omeprazole (PriLOSEC) 20 mg delayed release capsule   Yes No   Sig: Take 20 mg by mouth every other day   ondansetron (ZOFRAN-ODT) 4 mg disintegrating tablet   No No   Sig: Take 1 tablet (4 mg total) by mouth every 6 (six) hours as needed for nausea or vomiting      Facility-Administered Medications: None       Past Medical History:   Diagnosis Date   • GERD (gastroesophageal reflux disease)    • Hypertension    • Lumbar herniated disc     x 3       No past surgical history on file. No family history on file. I have reviewed and agree with the history as documented. E-Cigarette/Vaping     E-Cigarette/Vaping Substances     Social History     Tobacco Use   • Smoking status: Never   • Smokeless tobacco: Current     Types: Chew   Substance Use Topics   • Alcohol use: No   • Drug use: No        Review of Systems   Constitutional: Negative for chills and fever. HENT: Negative for ear pain and sore throat. Eyes: Positive for visual disturbance. Negative for pain. Respiratory: Negative for cough and shortness of breath. Cardiovascular: Negative for chest pain and palpitations. Gastrointestinal: Negative for abdominal pain and vomiting. Genitourinary: Negative for dysuria and hematuria. Musculoskeletal: Negative for arthralgias and back pain.    Skin: Negative for color change and rash. Neurological: Positive for light-headedness and numbness. Negative for seizures and syncope. All other systems reviewed and are negative. Physical Exam  ED Triage Vitals [07/14/23 2338]   Temperature Pulse Respirations Blood Pressure SpO2   98.3 °F (36.8 °C) 99 20 132/78 99 %      Temp Source Heart Rate Source Patient Position - Orthostatic VS BP Location FiO2 (%)   Oral Monitor Sitting Left arm --      Pain Score       No Pain             Orthostatic Vital Signs  Vitals:    07/14/23 2338   BP: 132/78   Pulse: 99   Patient Position - Orthostatic VS: Sitting       Physical Exam  Vitals and nursing note reviewed. Constitutional:       General: He is not in acute distress. Appearance: Normal appearance. HENT:      Head: Normocephalic and atraumatic. Right Ear: External ear normal.      Left Ear: External ear normal.      Nose: Nose normal.      Mouth/Throat:      Mouth: Mucous membranes are moist.   Eyes:      Conjunctiva/sclera: Conjunctivae normal.   Cardiovascular:      Rate and Rhythm: Regular rhythm. Tachycardia present. Comments: Heart rate of 101  Pulmonary:      Effort: Pulmonary effort is normal. No respiratory distress. Breath sounds: Normal breath sounds. Abdominal:      General: Abdomen is flat. Bowel sounds are normal.      Tenderness: There is no abdominal tenderness. There is no guarding or rebound. Musculoskeletal:         General: Normal range of motion. Cervical back: Normal range of motion. Skin:     General: Skin is warm and dry. Capillary Refill: Capillary refill takes less than 2 seconds. Neurological:      Mental Status: He is alert. Mental status is at baseline. Cranial Nerves: No cranial nerve deficit. Sensory: No sensory deficit. Motor: No weakness.       Coordination: Coordination normal.   Psychiatric:         Mood and Affect: Mood normal.         ED Medications  Medications - No data to display    Diagnostic Studies  Results Reviewed     Procedure Component Value Units Date/Time    Carboxyhemoglobin [[id number]]  (Normal) Collected: 07/15/23 0031    Lab Status: Final result Specimen: Blood from Arm, Left Updated: 07/15/23 0038     Carbon Monoxide, Blood 1.1 %     Narrative: Therapeutic levels (1 mg/mL and 2 mg/mL) of hydroxocobalamin may interfere with the fCOHb and fMetHb where it may cause lower than expected values  Normal Carboxyhemoglobin range for nonsmokers is <1.5%   Normal Carboxyhemoglobin range for smokers is 1.5% to 5.1%     Comprehensive metabolic panel [[id number]]  (Abnormal) Collected: 07/14/23 2344    Lab Status: Final result Specimen: Blood from Hand, Left Updated: 07/15/23 0032     Sodium 137 mmol/L      Potassium 4.7 mmol/L      Chloride 107 mmol/L      CO2 20 mmol/L      ANION GAP 10 mmol/L      BUN 20 mg/dL      Creatinine 1.03 mg/dL      Glucose 135 mg/dL      Calcium 9.4 mg/dL      AST 35 U/L      ALT 50 U/L      Alkaline Phosphatase 80 U/L      Total Protein 6.9 g/dL      Albumin 4.6 g/dL      Total Bilirubin 0.46 mg/dL      eGFR 96 ml/min/1.73sq m     Narrative:      Specimen Lipemic.   [place] guidelines for Chronic Kidney Disease (CKD):   •  Stage 1 with normal or high GFR (GFR > 90 mL/min/1.73 square meters)  •  Stage 2 Mild CKD (GFR = 60-89 mL/min/1.73 square meters)  •  Stage 3A Moderate CKD (GFR = 45-59 mL/min/1.73 square meters)  •  Stage 3B Moderate CKD (GFR = 30-44 mL/min/1.73 square meters)  •  Stage 4 Severe CKD (GFR = 15-29 mL/min/1.73 square meters)  •  Stage 5 End Stage CKD (GFR <15 mL/min/1.73 square meters)  Note: GFR calculation is accurate only with a steady state creatinine    HS Troponin 0hr (reflex protocol) [[id number]]  (Normal) Collected: 07/14/23 2344    Lab Status: Final result Specimen: Blood from Hand, Left Updated: 07/15/23 0030     hs TnI 0hr 3 ng/L     CBC and differential [[id number]] Collected: 07/14/23 2344 Lab Status: Final result Specimen: Blood from Arm, Left Updated: 07/15/23 0005     WBC 9.75 Thousand/uL      RBC 4.90 Million/uL      Hemoglobin 14.8 g/dL      Hematocrit 41.1 %      MCV 84 fL      MCH 30.2 pg      MCHC 36.0 g/dL      RDW 12.6 %      MPV 10.5 fL      Platelets 724 Thousands/uL      nRBC 0 /100 WBCs      Neutrophils Relative 63 %      Immat GRANS % 1 %      Lymphocytes Relative 29 %      Monocytes Relative 6 %      Eosinophils Relative 1 %      Basophils Relative 0 %      Neutrophils Absolute 6.18 Thousands/µL      Immature Grans Absolute 0.05 Thousand/uL      Lymphocytes Absolute 2.80 Thousands/µL      Monocytes Absolute 0.57 Thousand/µL      Eosinophils Absolute 0.12 Thousand/µL      Basophils Absolute 0.03 Thousands/µL                  XR chest 1 view portable   ED Interpretation by Grey Alarcon MD (07/15 0011)   No acute cardiopulmonary disease. Interpreted independently by myself.               Procedures  ECG 12 Lead Documentation Only    Date/Time: 7/15/2023 1:04 AM    Performed by: Grey Alarcon MD  Authorized by: Grey Alarcon MD    Indications / Diagnosis:  Lightheadedness  ECG reviewed by me, the ED Provider: yes    Patient location:  ED  Previous ECG:     Previous ECG:  Compared to current    Similarity:  No change  Interpretation:     Interpretation: abnormal    Rate:     ECG rate:  97    ECG rate assessment: normal    Rhythm:     Rhythm: sinus rhythm    Ectopy:     Ectopy: aberrant    QRS:     QRS axis:  Normal    QRS intervals:  Normal  Conduction:     Conduction: normal    ST segments:     ST segments:  Normal  T waves:     T waves: non-specific and flattening    Comments:      Normal sinus rhythm at 97 bpm, no PACs, no PVCs, nonspecific T wave flattening of inferior as well as lateral leads, unchanged since previous          ED Course  ED Course as of 07/15/23 0106   Sat Jul 15, 2023   0039 Carbon Monoxide, Blood: 1.1             HEART Risk Score    Flowsheet Row Most Recent Value   Heart Score Risk Calculator    History 0 Filed at: 07/15/2023 0040   ECG 0 Filed at: 07/15/2023 0040   Age 0 Filed at: 07/15/2023 0040   Risk Factors 1 Filed at: 07/15/2023 0040   Troponin 0 Filed at: 07/15/2023 0040   HEART Score 1 Filed at: 07/15/2023 0040                                Medical Decision Making  Patient presents with:  Dizziness: Patient arrived via EMS, patient is a  and went to a call, pt was on top of a roof handling metal and other materials. Patient states after he got off he felt dizzy, vision felt "focused" and was seeing "black" tunneled vision. Patient received 700 CC saline during transit. . EMS reports systolic was around 98 before fluids. Patient seen and examined noted to have overall benign exam, tachycardic to 101, clear lungs to auscultation bilaterally, abdomen soft nontender palpation without guarding or rebound, cranial nerves II through XII intact, sensation intact, strength 5 out of 5, coordination intact.       Temp:  (98.3 °F (36.8 °C)) 98.3 °F (36.8 °C)  HR:  (99) 99  Resp:  (20) 20  BP: (132)/(78) 132/78    Differential diagnosis includes but is not limited to heat exhaustion, dehydration, vasovagal.      Due to patient's history and presentation the following laboratory evidence was collected:  Recent Results (from the past 12 hour(s))  -ECG 12 lead:   Collection Time: 07/14/23 11:41 PM       Result                      Value             Ref Range           Ventricular Rate            97                BPM                 Atrial Rate                 97                BPM                 IN Interval                 144               ms                  QRSD Interval               90                ms                  QT Interval                 346               ms                  QTC Interval                439               ms                  P Axis                      62                degrees             QRS Axis 58                degrees             T Wave Axis                 29                degrees        -CBC and differential:   Collection Time: 07/14/23 11:44 PM       Result                      Value             Ref Range           WBC                         9.75              4.31 - 10.16*       RBC                         4.90              3.88 - 5.62 *       Hemoglobin                  14.8              12.0 - 17.0 *       Hematocrit                  41.1              36.5 - 49.3 %       MCV                         84                82 - 98 fL          MCH                         30.2              26.8 - 34.3 *       MCHC                        36.0              31.4 - 37.4 *       RDW                         12.6              11.6 - 15.1 %       MPV                         10.5              8.9 - 12.7 fL       Platelets                   251               149 - 390 Th*       nRBC                        0                 /100 WBCs           Neutrophils Relative        63                43 - 75 %           Immat GRANS %               1                 0 - 2 %             Lymphocytes Relative        29                14 - 44 %           Monocytes Relative          6                 4 - 12 %            Eosinophils Relative        1                 0 - 6 %             Basophils Relative          0                 0 - 1 %             Neutrophils Absolute        6.18              1.85 - 7.62 *       Immature Grans Absolute     0.05              0.00 - 0.20 *       Lymphocytes Absolute        2.80              0.60 - 4.47 *       Monocytes Absolute          0.57              0.17 - 1.22 *       Eosinophils Absolute        0.12              0.00 - 0.61 *       Basophils Absolute          0.03              0.00 - 0.10 *  -Comprehensive metabolic panel:   Collection Time: 07/14/23 11:44 PM       Result                      Value             Ref Range           Sodium                      137               135 - 147 mm*       Potassium                   4.7               3.5 - 5.3 mm*       Chloride                    107               96 - 108 mmo*       CO2                         20 (L)            21 - 32 mmol*       ANION GAP                   10                mmol/L              BUN                         20                5 - 25 mg/dL        Creatinine                  1.03              0.60 - 1.30 *       Glucose                     135               65 - 140 mg/*       Calcium                     9.4               8.4 - 10.2 m*       AST                         35                13 - 39 U/L         ALT                         50                7 - 52 U/L          Alkaline Phosphatase        80                34 - 104 U/L        Total Protein               6.9               6.4 - 8.4 g/*       Albumin                     4.6               3.5 - 5.0 g/*       Total Bilirubin             0.46              0.20 - 1.00 *       eGFR                        96                ml/min/1.73s*  -HS Troponin 0hr (reflex protocol):   Collection Time: 07/14/23 11:44 PM       Result                      Value             Ref Range           hs TnI 0hr                  3                 "Refer to [facility]*  -Carboxyhemoglobin:   Collection Time: 07/15/23 12:31 AM       Result                      Value             Ref Range           Carbon Monoxide, Blood      1.1               0.0 - 1.5 %      Due to patient's history and presentation the following imaging was collected:  XR chest 1 view portable   ED Interpretation    No acute cardiopulmonary disease. Interpreted independently by myself. EKG: interpreted by myself as above. Patient received fluids from EMS, reported being asymptomatic after fluid administration. Advised patient to follow-up with his family doctor concerning today's visit. Patient appears well, is nontoxic appearing, expresses understanding and agrees with plan of care at this time.   In light of this patient would benefit from outpatient management. Lightheadedness: acute illness or injury  Amount and/or Complexity of Data Reviewed  Labs: ordered. Decision-making details documented in ED Course. Radiology: ordered and independent interpretation performed. Disposition  Final diagnoses:   Lightheadedness     Time reflects when diagnosis was documented in both MDM as applicable and the Disposition within this note     Time User Action Codes Description Comment    7/15/2023 12:40 AM tian Vargas Ra Add [[id number]] [facility]       ED Disposition     ED Disposition   Discharge    Condition   Stable    Date/Time   Sat Jul 15, 2023 12:55 AM    Comment   Echo Mack discharge to home/self care. Follow-up Information     Follow up With Specialties Details Why Contact Info    Shirley Santacruz MD Internal Medicine   [address] [facility]  [address]  [address] [place]. [address] [place] [address]  [phone]            Patient's Medications   Discharge Prescriptions    No medications on file     No discharge procedures on file. PDMP Review     None           ED Provider  Attending physically available and evaluated Echo Mack. I managed the patient along with the ED Attending.     Electronically Signed by         Keshia Barron MD  07/15/23 8646

## 2023-07-16 LAB
ATRIAL RATE: 97 BPM
P AXIS: 62 DEGREES
PR INTERVAL: 144 MS
QRS AXIS: 58 DEGREES
QRSD INTERVAL: 90 MS
QT INTERVAL: 346 MS
QTC INTERVAL: 439 MS
T WAVE AXIS: 29 DEGREES
VENTRICULAR RATE: 97 BPM
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
## 2024-03-04 ENCOUNTER — HOSPITAL ENCOUNTER (EMERGENCY)
Facility: HOSPITAL | Age: 34
Discharge: HOME | End: 2024-03-05
Attending: EMERGENCY MEDICINE
Payer: COMMERCIAL

## 2024-03-04 ENCOUNTER — APPOINTMENT (EMERGENCY)
Dept: RADIOLOGY | Facility: HOSPITAL | Age: 34
End: 2024-03-04
Payer: COMMERCIAL

## 2024-03-04 VITALS
SYSTOLIC BLOOD PRESSURE: 121 MMHG | DIASTOLIC BLOOD PRESSURE: 68 MMHG | BODY MASS INDEX: 35.21 KG/M2 | WEIGHT: 259.92 LBS | OXYGEN SATURATION: 97 % | TEMPERATURE: 97.8 F | HEART RATE: 74 BPM | RESPIRATION RATE: 21 BRPM | HEIGHT: 72 IN

## 2024-03-04 DIAGNOSIS — R07.9 CHEST PAIN: Primary | ICD-10-CM

## 2024-03-04 LAB
ALBUMIN SERPL BCP-MCNC: 4.5 G/DL (ref 3.5–5)
ALP SERPL-CCNC: 57 U/L (ref 34–104)
ALT SERPL W P-5'-P-CCNC: 70 U/L (ref 7–52)
ANION GAP SERPL CALCULATED.3IONS-SCNC: 9 MMOL/L
AST SERPL W P-5'-P-CCNC: 36 U/L (ref 13–39)
BASOPHILS # BLD AUTO: 0.03 THOUSANDS/ÂΜL (ref 0–0.1)
BASOPHILS NFR BLD AUTO: 0 % (ref 0–1)
BILIRUB SERPL-MCNC: 0.6 MG/DL (ref 0.2–1)
BUN SERPL-MCNC: 19 MG/DL (ref 5–25)
CALCIUM SERPL-MCNC: 9.6 MG/DL (ref 8.4–10.2)
CARDIAC TROPONIN I PNL SERPL HS: <2 NG/L
CARDIAC TROPONIN I PNL SERPL HS: <2 NG/L
CHLORIDE SERPL-SCNC: 104 MMOL/L (ref 96–108)
CO2 SERPL-SCNC: 24 MMOL/L (ref 21–32)
CREAT SERPL-MCNC: 0.9 MG/DL (ref 0.6–1.3)
EOSINOPHIL # BLD AUTO: 0.12 THOUSAND/ÂΜL (ref 0–0.61)
EOSINOPHIL NFR BLD AUTO: 1 % (ref 0–6)
ERYTHROCYTE [DISTWIDTH] IN BLOOD BY AUTOMATED COUNT: 12.5 % (ref 11.6–15.1)
GFR SERPL CREATININE-BSD FRML MDRD: 112 ML/MIN/1.73SQ M
GLUCOSE SERPL-MCNC: 81 MG/DL (ref 65–140)
HCT VFR BLD AUTO: 41.8 % (ref 36.5–49.3)
HGB BLD-MCNC: 15.1 G/DL (ref 12–17)
IMM GRANULOCYTES # BLD AUTO: 0.02 THOUSAND/UL (ref 0–0.2)
IMM GRANULOCYTES NFR BLD AUTO: 0 % (ref 0–2)
LYMPHOCYTES # BLD AUTO: 4.47 THOUSANDS/ÂΜL (ref 0.6–4.47)
LYMPHOCYTES NFR BLD AUTO: 49 % (ref 14–44)
MCH RBC QN AUTO: 30 PG (ref 26.8–34.3)
MCHC RBC AUTO-ENTMCNC: 36.1 G/DL (ref 31.4–37.4)
MCV RBC AUTO: 83 FL (ref 82–98)
MONOCYTES # BLD AUTO: 0.54 THOUSAND/ÂΜL (ref 0.17–1.22)
MONOCYTES NFR BLD AUTO: 6 % (ref 4–12)
NEUTROPHILS # BLD AUTO: 4.13 THOUSANDS/ÂΜL (ref 1.85–7.62)
NEUTS SEG NFR BLD AUTO: 44 % (ref 43–75)
NRBC BLD AUTO-RTO: 0 /100 WBCS
PLATELET # BLD AUTO: 244 THOUSANDS/UL (ref 149–390)
PMV BLD AUTO: 10 FL (ref 8.9–12.7)
POTASSIUM SERPL-SCNC: 3.7 MMOL/L (ref 3.5–5.3)
PROT SERPL-MCNC: 6.9 G/DL (ref 6.4–8.4)
RBC # BLD AUTO: 5.04 MILLION/UL (ref 3.88–5.62)
SODIUM SERPL-SCNC: 137 MMOL/L (ref 135–147)
WBC # BLD AUTO: 9.31 THOUSAND/UL (ref 4.31–10.16)

## 2024-03-04 PROCEDURE — 71045 X-RAY EXAM CHEST 1 VIEW: CPT

## 2024-03-04 PROCEDURE — 36415 COLL VENOUS BLD VENIPUNCTURE: CPT | Performed by: EMERGENCY MEDICINE

## 2024-03-04 PROCEDURE — 85025 COMPLETE CBC W/AUTO DIFF WBC: CPT | Performed by: EMERGENCY MEDICINE

## 2024-03-04 PROCEDURE — 93005 ELECTROCARDIOGRAM TRACING: CPT

## 2024-03-04 PROCEDURE — 99285 EMERGENCY DEPT VISIT HI MDM: CPT

## 2024-03-04 PROCEDURE — 96360 HYDRATION IV INFUSION INIT: CPT

## 2024-03-04 PROCEDURE — 80053 COMPREHEN METABOLIC PANEL: CPT | Performed by: EMERGENCY MEDICINE

## 2024-03-04 PROCEDURE — 96361 HYDRATE IV INFUSION ADD-ON: CPT

## 2024-03-04 PROCEDURE — 84484 ASSAY OF TROPONIN QUANT: CPT | Performed by: EMERGENCY MEDICINE

## 2024-03-04 RX ORDER — ONDANSETRON 2 MG/ML
4 INJECTION INTRAMUSCULAR; INTRAVENOUS ONCE
Status: DISCONTINUED | OUTPATIENT
Start: 2024-03-04 | End: 2024-03-05 | Stop reason: HOSPADM

## 2024-03-04 RX ADMIN — SODIUM CHLORIDE 1000 ML: 0.9 INJECTION, SOLUTION INTRAVENOUS at 21:15

## 2024-03-05 LAB
ATRIAL RATE: 71 BPM
ATRIAL RATE: 73 BPM
P AXIS: 45 DEGREES
P AXIS: 50 DEGREES
PR INTERVAL: 138 MS
PR INTERVAL: 142 MS
QRS AXIS: 47 DEGREES
QRS AXIS: 54 DEGREES
QRSD INTERVAL: 92 MS
QRSD INTERVAL: 94 MS
QT INTERVAL: 364 MS
QT INTERVAL: 366 MS
QTC INTERVAL: 397 MS
QTC INTERVAL: 401 MS
T WAVE AXIS: 26 DEGREES
T WAVE AXIS: 26 DEGREES
VENTRICULAR RATE: 71 BPM
VENTRICULAR RATE: 73 BPM

## 2024-03-05 PROCEDURE — 93010 ELECTROCARDIOGRAM REPORT: CPT | Performed by: INTERNAL MEDICINE

## 2024-03-05 PROCEDURE — 99284 EMERGENCY DEPT VISIT MOD MDM: CPT | Performed by: EMERGENCY MEDICINE

## 2024-03-05 NOTE — ED PROVIDER NOTES
History  Chief Complaint   Patient presents with    Chest Pain     Chest pressures with intermittent nausea and feeling like he could pass out. Symptoms for a few days. Having palpitations.      31-year-old male presents emergency department for evaluation of chest pain.  Patient had chest pressure intermittent nausea, fatigue.  Lightheadedness.        Prior to Admission Medications   Prescriptions Last Dose Informant Patient Reported? Taking?   dicyclomine (BENTYL) 20 mg tablet   No No   Sig: Take 1 tablet (20 mg total) by mouth 2 (two) times a day   omeprazole (PriLOSEC) 20 mg delayed release capsule   Yes No   Sig: Take 20 mg by mouth every other day   ondansetron (ZOFRAN-ODT) 4 mg disintegrating tablet   No No   Sig: Take 1 tablet (4 mg total) by mouth every 6 (six) hours as needed for nausea or vomiting      Facility-Administered Medications: None       Past Medical History:   Diagnosis Date    GERD (gastroesophageal reflux disease)     Hypertension     Lumbar herniated disc     x 3       History reviewed. No pertinent surgical history.    History reviewed. No pertinent family history.  I have reviewed and agree with the history as documented.    E-Cigarette/Vaping    E-Cigarette Use Never User      E-Cigarette/Vaping Substances     Social History     Tobacco Use    Smoking status: Never    Smokeless tobacco: Current     Types: Chew   Vaping Use    Vaping status: Never Used   Substance Use Topics    Alcohol use: No    Drug use: No       Review of Systems   Constitutional:  Negative for appetite change, chills, fatigue and fever.   HENT:  Negative for sneezing and sore throat.    Eyes:  Negative for visual disturbance.   Respiratory:  Negative for cough, choking, chest tightness, shortness of breath and wheezing.    Cardiovascular:  Negative for chest pain and palpitations.   Gastrointestinal:  Negative for abdominal pain, constipation, diarrhea, nausea and vomiting.   Genitourinary:  Negative for difficulty  urinating and dysuria.   Neurological:  Negative for dizziness, weakness, light-headedness, numbness and headaches.   All other systems reviewed and are negative.      Physical Exam  Physical Exam  Vitals and nursing note reviewed.   Constitutional:       General: He is not in acute distress.     Appearance: He is well-developed. He is not diaphoretic.   HENT:      Head: Normocephalic and atraumatic.   Eyes:      Pupils: Pupils are equal, round, and reactive to light.   Neck:      Vascular: No JVD.      Trachea: No tracheal deviation.   Cardiovascular:      Rate and Rhythm: Normal rate and regular rhythm.      Heart sounds: Normal heart sounds. No murmur heard.     No friction rub. No gallop.   Pulmonary:      Effort: Pulmonary effort is normal. No respiratory distress.      Breath sounds: Normal breath sounds. No wheezing or rales.   Abdominal:      General: Bowel sounds are normal. There is no distension.      Palpations: Abdomen is soft.      Tenderness: There is no abdominal tenderness. There is no guarding or rebound.   Skin:     General: Skin is warm and dry.      Coloration: Skin is not pale.   Neurological:      Mental Status: He is alert and oriented to person, place, and time.      Cranial Nerves: No cranial nerve deficit.      Motor: No abnormal muscle tone.   Psychiatric:         Behavior: Behavior normal.         Vital Signs  ED Triage Vitals   Temperature Pulse Respirations Blood Pressure SpO2   03/04/24 1856 03/04/24 1856 03/04/24 1856 03/04/24 1856 03/04/24 1856   97.8 °F (36.6 °C) 81 18 134/75 99 %      Temp src Heart Rate Source Patient Position - Orthostatic VS BP Location FiO2 (%)   -- 03/04/24 1930 03/04/24 1930 03/04/24 1930 --    Monitor Lying Right arm       Pain Score       03/05/24 0022       No Pain           Vitals:    03/04/24 2100 03/04/24 2200 03/04/24 2230 03/04/24 2300   BP: 121/69 127/70 115/65 121/68   Pulse: 73 76 72 74   Patient Position - Orthostatic VS: Lying Lying Lying Lying          Visual Acuity      ED Medications  Medications   sodium chloride 0.9 % bolus 1,000 mL (0 mL Intravenous Stopped 3/4/24 9399)       Diagnostic Studies  Results Reviewed       Procedure Component Value Units Date/Time    HS Troponin I 2hr [[id number]] Collected: 03/04/24 2217    Lab Status: Final result Specimen: Blood from Arm, Right Updated: 03/04/24 2246     hs TnI 2hr <2 ng/L      Delta 2hr hsTnI --    HS Troponin 0hr (reflex protocol) [[id number]]  (Normal) Collected: 03/04/24 1938    Lab Status: Final result Specimen: Blood from Arm, Right Updated: 03/04/24 2007     hs TnI 0hr <2 ng/L     Comprehensive metabolic panel [[id number]]  (Abnormal) Collected: 03/04/24 1938    Lab Status: Final result Specimen: Blood from Arm, Right Updated: 03/04/24 2001     Sodium 137 mmol/L      Potassium 3.7 mmol/L      Chloride 104 mmol/L      CO2 24 mmol/L      ANION GAP 9 mmol/L      BUN 19 mg/dL      Creatinine 0.90 mg/dL      Glucose 81 mg/dL      Calcium 9.6 mg/dL      AST 36 U/L      ALT 70 U/L      Alkaline Phosphatase 57 U/L      Total Protein 6.9 g/dL      Albumin 4.5 g/dL      Total Bilirubin 0.60 mg/dL      eGFR 112 ml/min/1.73sq m     Narrative:      National Kidney Disease Foundation guidelines for Chronic Kidney Disease (CKD):     Stage 1 with normal or high GFR (GFR > 90 mL/min/1.73 square meters)    Stage 2 Mild CKD (GFR = 60-89 mL/min/1.73 square meters)    Stage 3A Moderate CKD (GFR = 45-59 mL/min/1.73 square meters)    Stage 3B Moderate CKD (GFR = 30-44 mL/min/1.73 square meters)    Stage 4 Severe CKD (GFR = 15-29 mL/min/1.73 square meters)    Stage 5 End Stage CKD (GFR <15 mL/min/1.73 square meters)  Note: GFR calculation is accurate only with a steady state creatinine    CBC and differential [[id number]]  (Abnormal) Collected: 03/04/24 1938    Lab Status: Final result Specimen: Blood from Arm, Right Updated: 03/04/24 1943     WBC 9.31 Thousand/uL      RBC 5.04 Million/uL      Hemoglobin 15.1 g/dL       Hematocrit 41.8 %      MCV 83 fL      MCH 30.0 pg      MCHC 36.1 g/dL      RDW 12.5 %      MPV 10.0 fL      Platelets 244 Thousands/uL      nRBC 0 /100 WBCs      Neutrophils Relative 44 %      Immat GRANS % 0 %      Lymphocytes Relative 49 %      Monocytes Relative 6 %      Eosinophils Relative 1 %      Basophils Relative 0 %      Neutrophils Absolute 4.13 Thousands/µL      Immature Grans Absolute 0.02 Thousand/uL      Lymphocytes Absolute 4.47 Thousands/µL      Monocytes Absolute 0.54 Thousand/µL      Eosinophils Absolute 0.12 Thousand/µL      Basophils Absolute 0.03 Thousands/µL                    XR chest 1 view portable    (Results Pending)              Procedures  Procedures         ED Course             HEART Risk Score      Flowsheet Row Most Recent Value   Heart Score Risk Calculator    History 0 Filed at: 03/04/2024 2358   ECG 0 Filed at: 03/04/2024 2358   Age 0 Filed at: 03/04/2024 2358   Risk Factors 1 Filed at: 03/04/2024 2358   Troponin 0 Filed at: 03/04/2024 2358   HEART Score 1 Filed at: 03/04/2024 2358                          SBIRT 22yo+      Flowsheet Row Most Recent Value   Initial Alcohol Screen: US AUDIT-C     1. How often do you have a drink containing alcohol? 0 Filed at: 03/04/2024 1856   2. How many drinks containing alcohol do you have on a typical day you are drinking?  0 Filed at: 03/04/2024 1856   3a. Male UNDER 65: How often do you have five or more drinks on one occasion? 0 Filed at: 03/04/2024 1856   3b. FEMALE Any Age, or MALE 65+: How often do you have 4 or more drinks on one occassion? 0 Filed at: 03/04/2024 1856   Audit-C Score 0 Filed at: 03/04/2024 1856   JOSEFINA: How many times in the past year have you...    Used an illegal drug or used a prescription medication for non-medical reasons? Never Filed at: 03/04/2024 1856                      Medical Decision Making  33-year-old male with chest pain lightheadedness well-appearing here, will give fluids, check cardiac labs,  reassess.    Amount and/or Complexity of Data Reviewed  Labs: ordered.  Radiology: ordered.             Disposition  Final diagnoses:   Chest pain     Time reflects when diagnosis was documented in both MDM as applicable and the Disposition within this note       Time User Action Codes Description Comment    3/4/2024 11:55 PM Mario Alberto Salazar [R07.9] Chest pain           ED Disposition       ED Disposition   Discharge    Condition   Stable    Date/Time   Mon Mar 4, 2024 8555    Comment   Harrison Ramires discharge to home/self care.                   Follow-up Information       Follow up With Specialties Details Why Contact Info    Suman Joe MD Internal Medicine   [address] [place] [id number]  [phone]              Discharge Medication List as of 3/4/2024 11:58 PM        CONTINUE these medications which have NOT CHANGED    Details   dicyclomine (BENTYL) 20 mg tablet Take 1 tablet (20 mg total) by mouth 2 (two) times a day, Starting Tue 6/13/2023, Normal      omeprazole (PriLOSEC) 20 mg delayed release capsule Take 20 mg by mouth every other day, Historical Med      ondansetron (ZOFRAN-ODT) 4 mg disintegrating tablet Take 1 tablet (4 mg total) by mouth every 6 (six) hours as needed for nausea or vomiting, Starting Tue 6/13/2023, Normal             No discharge procedures on file.    PDMP Review       None            ED Provider  Electronically Signed by             Mario Alberto Salazar MD  03/05/24 0653

## 2024-09-29 ENCOUNTER — HOSPITAL ENCOUNTER (EMERGENCY)
Facility: HOSPITAL | Age: 34
Discharge: HOME | End: 2024-09-29
Attending: EMERGENCY MEDICINE | Admitting: EMERGENCY MEDICINE
Payer: COMMERCIAL

## 2024-09-29 ENCOUNTER — APPOINTMENT (EMERGENCY)
Dept: RADIOLOGY | Facility: HOSPITAL | Age: 34
End: 2024-09-29
Payer: COMMERCIAL

## 2024-09-29 VITALS
HEART RATE: 77 BPM | BODY MASS INDEX: 35.64 KG/M2 | RESPIRATION RATE: 19 BRPM | WEIGHT: 262.79 LBS | OXYGEN SATURATION: 97 % | DIASTOLIC BLOOD PRESSURE: 71 MMHG | SYSTOLIC BLOOD PRESSURE: 136 MMHG | TEMPERATURE: 97.6 F

## 2024-09-29 DIAGNOSIS — R07.9 CHEST PAIN: Primary | ICD-10-CM

## 2024-09-29 LAB
ALBUMIN SERPL BCG-MCNC: 4.9 G/DL (ref 3.5–5)
ALP SERPL-CCNC: 65 U/L (ref 34–104)
ALT SERPL W P-5'-P-CCNC: 55 U/L (ref 7–52)
ANION GAP SERPL CALCULATED.3IONS-SCNC: 8 MMOL/L (ref 4–13)
AST SERPL W P-5'-P-CCNC: 28 U/L (ref 13–39)
BASOPHILS # BLD AUTO: 0.05 THOUSANDS/ÂΜL (ref 0–0.1)
BASOPHILS NFR BLD AUTO: 1 % (ref 0–1)
BILIRUB SERPL-MCNC: 0.59 MG/DL (ref 0.2–1)
BUN SERPL-MCNC: 13 MG/DL (ref 5–25)
CALCIUM SERPL-MCNC: 9.5 MG/DL (ref 8.4–10.2)
CARDIAC TROPONIN I PNL SERPL HS: <2 NG/L
CHLORIDE SERPL-SCNC: 101 MMOL/L (ref 96–108)
CO2 SERPL-SCNC: 26 MMOL/L (ref 21–32)
CREAT SERPL-MCNC: 0.96 MG/DL (ref 0.6–1.3)
EOSINOPHIL # BLD AUTO: 0.16 THOUSAND/ÂΜL (ref 0–0.61)
EOSINOPHIL NFR BLD AUTO: 2 % (ref 0–6)
ERYTHROCYTE [DISTWIDTH] IN BLOOD BY AUTOMATED COUNT: 12.4 % (ref 11.6–15.1)
FLUAV AG UPPER RESP QL IA.RAPID: NEGATIVE
FLUBV AG UPPER RESP QL IA.RAPID: NEGATIVE
GFR SERPL CREATININE-BSD FRML MDRD: 103 ML/MIN/1.73SQ M
GLUCOSE SERPL-MCNC: 106 MG/DL (ref 65–140)
HCT VFR BLD AUTO: 45.4 % (ref 36.5–49.3)
HGB BLD-MCNC: 16 G/DL (ref 12–17)
IMM GRANULOCYTES # BLD AUTO: 0.04 THOUSAND/UL (ref 0–0.2)
IMM GRANULOCYTES NFR BLD AUTO: 0 % (ref 0–2)
LYMPHOCYTES # BLD AUTO: 3.16 THOUSANDS/ÂΜL (ref 0.6–4.47)
LYMPHOCYTES NFR BLD AUTO: 34 % (ref 14–44)
MCH RBC QN AUTO: 29.5 PG (ref 26.8–34.3)
MCHC RBC AUTO-ENTMCNC: 35.2 G/DL (ref 31.4–37.4)
MCV RBC AUTO: 84 FL (ref 82–98)
MONOCYTES # BLD AUTO: 0.54 THOUSAND/ÂΜL (ref 0.17–1.22)
MONOCYTES NFR BLD AUTO: 6 % (ref 4–12)
NEUTROPHILS # BLD AUTO: 5.34 THOUSANDS/ÂΜL (ref 1.85–7.62)
NEUTS SEG NFR BLD AUTO: 57 % (ref 43–75)
NRBC BLD AUTO-RTO: 0 /100 WBCS
PLATELET # BLD AUTO: 256 THOUSANDS/UL (ref 149–390)
PMV BLD AUTO: 9.8 FL (ref 8.9–12.7)
POTASSIUM SERPL-SCNC: 3.4 MMOL/L (ref 3.5–5.3)
PROT SERPL-MCNC: 7.3 G/DL (ref 6.4–8.4)
RBC # BLD AUTO: 5.42 MILLION/UL (ref 3.88–5.62)
SARS-COV+SARS-COV-2 AG RESP QL IA.RAPID: NEGATIVE
SODIUM SERPL-SCNC: 135 MMOL/L (ref 135–147)
WBC # BLD AUTO: 9.29 THOUSAND/UL (ref 4.31–10.16)

## 2024-09-29 PROCEDURE — 80053 COMPREHEN METABOLIC PANEL: CPT | Performed by: EMERGENCY MEDICINE

## 2024-09-29 PROCEDURE — 36415 COLL VENOUS BLD VENIPUNCTURE: CPT | Performed by: EMERGENCY MEDICINE

## 2024-09-29 PROCEDURE — 93005 ELECTROCARDIOGRAM TRACING: CPT

## 2024-09-29 PROCEDURE — 99285 EMERGENCY DEPT VISIT HI MDM: CPT

## 2024-09-29 PROCEDURE — 96360 HYDRATION IV INFUSION INIT: CPT

## 2024-09-29 PROCEDURE — 85025 COMPLETE CBC W/AUTO DIFF WBC: CPT | Performed by: EMERGENCY MEDICINE

## 2024-09-29 PROCEDURE — 87804 INFLUENZA ASSAY W/OPTIC: CPT | Performed by: EMERGENCY MEDICINE

## 2024-09-29 PROCEDURE — 99285 EMERGENCY DEPT VISIT HI MDM: CPT | Performed by: EMERGENCY MEDICINE

## 2024-09-29 PROCEDURE — 87811 SARS-COV-2 COVID19 W/OPTIC: CPT | Performed by: EMERGENCY MEDICINE

## 2024-09-29 PROCEDURE — 71045 X-RAY EXAM CHEST 1 VIEW: CPT

## 2024-09-29 PROCEDURE — 84484 ASSAY OF TROPONIN QUANT: CPT | Performed by: EMERGENCY MEDICINE

## 2024-09-29 RX ORDER — ATENOLOL 25 MG/1
25 TABLET ORAL DAILY
COMMUNITY

## 2024-09-29 RX ADMIN — SODIUM CHLORIDE 1000 ML: 0.9 INJECTION, SOLUTION INTRAVENOUS at 16:42

## 2024-09-29 NOTE — DISCHARGE INSTRUCTIONS
We have performed an evaluation of your chest pain in the emergency department and determined that you can be safely discharged home. We have provided you with general information about chest pain in adults. We recommend that you follow up with your primary care provider in the next 5-7 days for further evaluation. If your chest pain changes, worsens, if you have significant associated shortness of breath, palpitations, diaphoresis, persistent nausea and vomiting, or if you pass out, return to the emergency department immediately.  Call the provided number to schedule an appointment with cardiology.  You may warrant Holter monitoring to monitor for arrhythmia

## 2024-09-29 NOTE — ED PROVIDER NOTES
Final diagnoses:   Chest pain     ED Disposition       ED Disposition   Discharge    Condition   Stable    Date/Time   Sun Sep 29, 2024  5:10 PM    Comment   Harrison Ramires discharge to home/self care.                   Assessment & Plan       Medical Decision Making  Differential diagnosis includes but is not limited to panic attack, arrhythmia, anemia, dehydration, atypical presentation of ACS.  I ordered and reviewed lab work including CBC, CMP, troponin.  I ordered and independently interpreted an EKG which was grossly unremarkable.  I ordered and independently interpreted chest x-ray.  I treated patient with IV fluids.  Patient still minimally symptomatic on reassessment but overall feels okay.  Lab work all grossly unremarkable.  Provided with reassurance, instructions to follow-up with cardiology not emergently, discharged with return precautions.    Amount and/or Complexity of Data Reviewed  External Data Reviewed: notes.     Details: Patient evaluated on 7/14/24 for dizziness, tunnel vision, normal blood work including carbon monoxide testing, and a normal EKG, and was discharged.  Patient was evaluated on 3/5/2024 for chest pain, near syncope for several days, additionally with some palpitations.  Patient received fluids, had a negative troponin, grossly unremarkable EKG, and was discharged.  Labs: ordered.  Radiology: ordered and independent interpretation performed.             Medications   sodium chloride 0.9 % bolus 1,000 mL (0 mL Intravenous Stopped 9/29/24 1720)       ED Risk Strat Scores   HEART Risk Score      Flowsheet Row Most Recent Value   Heart Score Risk Calculator    History 0 Filed at: 09/29/2024 1720   ECG 1 Filed at: 09/29/2024 1720   Age 0 Filed at: 09/29/2024 1720   Risk Factors 0 Filed at: 09/29/2024 1720   Troponin 0 Filed at: 09/29/2024 1720   HEART Score 1 Filed at: 09/29/2024 1720                               SBIRT 22yo+      Flowsheet Row Most Recent Value   Initial Alcohol  Screen: US AUDIT-C     1. How often do you have a drink containing alcohol? 0 Filed at: 09/29/2024 1620   2. How many drinks containing alcohol do you have on a typical day you are drinking?  0 Filed at: 09/29/2024 1620   3a. Male UNDER 65: How often do you have five or more drinks on one occasion? 0 Filed at: 09/29/2024 1620   3b. FEMALE Any Age, or MALE 65+: How often do you have 4 or more drinks on one occassion? 0 Filed at: 09/29/2024 1620   Audit-C Score 0 Filed at: 09/29/2024 1620   JOSEFINA: How many times in the past year have you...    Used an illegal drug or used a prescription medication for non-medical reasons? Never Filed at: 09/29/2024 1620                            History of Present Illness       Chief Complaint   Patient presents with    Chest Pain     Started with sensation of feeling of lightheadedness about 2 hours ago. Then started with some chest tightness and shortness of breath and tingling       Past Medical History:   Diagnosis Date    GERD (gastroesophageal reflux disease)     Hypertension     Lumbar herniated disc     x 3      History reviewed. No pertinent surgical history.   History reviewed. No pertinent family history.   Social History     Tobacco Use    Smoking status: Never    Smokeless tobacco: Current     Types: Chew   Vaping Use    Vaping status: Never Used   Substance Use Topics    Alcohol use: No    Drug use: No      E-Cigarette/Vaping    E-Cigarette Use Never User       E-Cigarette/Vaping Substances      I have reviewed and agree with the history as documented.     Patient is a 34-year-old male presenting for evaluation of chest tightness, lightheadedness.  Patient states that symptoms for started at 1400 while he was at a rest stop [place] from the beach and have progressively worsened since that time.  Patient states that he continues to feel like he is going to pass out in the emergency department.  Patient denies any pleuritic pain, denies leg pain or swelling, recent  immobilization or surgery, recent long travel, history of DVT/PE.  Patient with several similar episodes over the course of the past year and a half but states that this has been the most severe and persistent.  Patient denies prior cardiac history.  Patient states that he has been under a lot of stress at home recently.        Review of Systems   Constitutional:  Negative for chills, fatigue and fever.   Respiratory:  Positive for shortness of breath. Negative for cough.    Cardiovascular:  Positive for chest pain and palpitations. Negative for leg swelling.   Gastrointestinal:  Negative for diarrhea, nausea and vomiting.   Musculoskeletal:  Negative for arthralgias and myalgias.   Neurological:  Positive for light-headedness. Negative for weakness and numbness.   Psychiatric/Behavioral:  Negative for confusion.    All other systems reviewed and are negative.          Objective       ED Triage Vitals [09/29/24 1607]   Temperature Pulse Blood Pressure Respirations SpO2 Patient Position - Orthostatic VS   97.6 °F (36.4 °C) 75 130/76 (!) 24 100 % Sitting      Temp Source Heart Rate Source BP Location FiO2 (%) Pain Score    Tympanic Monitor Left arm -- 6      Vitals      Date and Time Temp Pulse SpO2 Resp BP Pain Score FACES Pain Rating User   09/29/24 1715 -- 77 97 % 19 136/71 -- --    09/29/24 1700 -- 76 96 % 16 127/71 -- --    09/29/24 1645 -- 79 97 % 18 134/76 -- --    09/29/24 1615 -- 80 98 % 16 130/76 -- --    09/29/24 1607 97.6 °F (36.4 °C) 75 100 % 24 130/76 6 -- LS            Physical Exam  Vitals and nursing note reviewed.   Constitutional:       General: He is not in acute distress.     Appearance: Normal appearance. He is not ill-appearing, toxic-appearing or diaphoretic.      Comments: Well-appearing, nontoxic, nondistressed   HENT:      Head: Normocephalic and atraumatic.      Comments: Moist mucous membranes     Right Ear: External ear normal.      Left Ear: External ear normal.   Eyes:       General:         Right eye: No discharge.         Left eye: No discharge.   Cardiovascular:      Comments: Regular rate and rhythm, no murmurs rubs or gallops.  Extremities warm and well-perfused without mottling  Pulmonary:      Effort: No respiratory distress.      Comments: No increased work of breathing.  Speaking in complete sentences.  Lungs clear to auscultation bilaterally without wheezes, rales, rhonchi.  Satting 98% on room air indicating adequate oxygenation  Abdominal:      General: There is no distension.      Comments: Abdomen soft, nontender, nondistended without rigidity, rebound, guarding   Musculoskeletal:         General: No deformity.      Cervical back: Normal range of motion.      Comments: No lower extremity swelling, erythema, calf tenderness   Skin:     Findings: No lesion or rash.   Neurological:      Mental Status: He is alert and oriented to person, place, and time. Mental status is at baseline.      Comments: Awake, alert, pleasant, interactive   Psychiatric:         Mood and Affect: Mood and affect normal.         Results Reviewed       Procedure Component Value Units Date/Time    HS Troponin 0hr (reflex protocol) [[id number]]  (Normal) Collected: 09/29/24 1617    Lab Status: Final result Specimen: Blood from Arm, Right Updated: 09/29/24 1710     hs TnI 0hr <2 ng/L     Comprehensive metabolic panel [[id number]]  (Abnormal) Collected: 09/29/24 1617    Lab Status: Final result Specimen: Blood from Arm, Right Updated: 09/29/24 1708     Sodium 135 mmol/L      Potassium 3.4 mmol/L      Chloride 101 mmol/L      CO2 26 mmol/L      ANION GAP 8 mmol/L      BUN 13 mg/dL      Creatinine 0.96 mg/dL      Glucose 106 mg/dL      Calcium 9.5 mg/dL      AST 28 U/L      ALT 55 U/L      Alkaline Phosphatase 65 U/L      Total Protein 7.3 g/dL      Albumin 4.9 g/dL      Total Bilirubin 0.59 mg/dL      eGFR 103 ml/min/1.73sq m     Narrative:      National Kidney Disease Foundation guidelines for Chronic Kidney  Disease (CKD):     Stage 1 with normal or high GFR (GFR > 90 mL/min/1.73 square meters)    Stage 2 Mild CKD (GFR = 60-89 mL/min/1.73 square meters)    Stage 3A Moderate CKD (GFR = 45-59 mL/min/1.73 square meters)    Stage 3B Moderate CKD (GFR = 30-44 mL/min/1.73 square meters)    Stage 4 Severe CKD (GFR = 15-29 mL/min/1.73 square meters)    Stage 5 End Stage CKD (GFR <15 mL/min/1.73 square meters)  Note: GFR calculation is accurate only with a steady state creatinine    FLU/COVID Rapid Antigen (30 min. TAT) - Preferred screening test in ED [[id number]]  (Normal) Collected: 09/29/24 1617    Lab Status: Final result Specimen: Nares from Nose Updated: 09/29/24 1641     SARS COV Rapid Antigen Negative     Influenza A Rapid Antigen Negative     Influenza B Rapid Antigen Negative    Narrative:      This test has been performed using the [employer] 2 FLU+SARS Antigen test under the Emergency Use Authorization (EUA). This test has been validated by the  and verified by the performing laboratory. The Jeannette uses lateral flow immunofluorescent sandwich assay to detect SARS-COV, Influenza A and Influenza B Antigen.     The [employer] 2 SARS Antigen test does not differentiate between SARS-CoV and SARS-CoV-2.     Negative results are presumptive and may be confirmed with a molecular assay, if necessary, for patient management. Negative results do not rule out SARS-CoV-2 or influenza infection and should not be used as the sole basis for treatment or patient management decisions. A negative test result may occur if the level of antigen in a sample is below the limit of detection of this test.     Positive results are indicative of the presence of viral antigens, but do not rule out bacterial infection or co-infection with other viruses.     All test results should be used as an adjunct to clinical observations and other information available to the provider.    FOR PEDIATRIC PATIENTS - copy/paste COVID Guidelines  URL to browser: [email or web address]    CBC and differential [[id number]] Collected: 09/29/24 1617    Lab Status: Final result Specimen: Blood from Arm, Right Updated: 09/29/24 1624     WBC 9.29 Thousand/uL      RBC 5.42 Million/uL      Hemoglobin 16.0 g/dL      Hematocrit 45.4 %      MCV 84 fL      MCH 29.5 pg      MCHC 35.2 g/dL      RDW 12.4 %      MPV 9.8 fL      Platelets 256 Thousands/uL      nRBC 0 /100 WBCs      Segmented % 57 %      Immature Grans % 0 %      Lymphocytes % 34 %      Monocytes % 6 %      Eosinophils Relative 2 %      Basophils Relative 1 %      Absolute Neutrophils 5.34 Thousands/µL      Absolute Immature Grans 0.04 Thousand/uL      Absolute Lymphocytes 3.16 Thousands/µL      Absolute Monocytes 0.54 Thousand/µL      Eosinophils Absolute 0.16 Thousand/µL      Basophils Absolute 0.05 Thousands/µL             XR chest 1 view portable   ED Interpretation by Ervin Burr MD (09/29 1708)   No acute cardiopulmonary abnormality          ECG 12 Lead Documentation Only    Date/Time: 9/29/2024 5:09 PM    Performed by: Ervin Burr MD  Authorized by: Ervin Burr MD    Indications / Diagnosis:  Chest tightness  ECG reviewed by me, the ED Provider: yes    Patient location:  ED  Interpretation:     Interpretation: normal    Rate:     ECG rate:  82    ECG rate assessment: normal    Rhythm:     Rhythm: sinus rhythm    Ectopy:     Ectopy: none    QRS:     QRS axis:  Normal    QRS intervals:  Normal  Conduction:     Conduction: normal    ST segments:     ST segments:  Normal  T waves:     T waves: normal        ED Medication and Procedure Management   Prior to Admission Medications   Prescriptions Last Dose Informant Patient Reported? Taking?   atenolol (TENORMIN) 25 mg tablet   Yes Yes   Sig: Take 25 mg by mouth daily   dicyclomine (BENTYL) 20 mg tablet Not Taking  No No   Sig: Take 1 tablet (20 mg total) by mouth 2 (two) times a day    Patient not taking: Reported on 9/29/2024   omeprazole (PriLOSEC) 20 mg delayed release capsule   Yes No   Sig: Take 20 mg by mouth if needed   ondansetron (ZOFRAN-ODT) 4 mg disintegrating tablet Not Taking  No No   Sig: Take 1 tablet (4 mg total) by mouth every 6 (six) hours as needed for nausea or vomiting   Patient not taking: Reported on 9/29/2024      Facility-Administered Medications: None     Discharge Medication List as of 9/29/2024  5:11 PM        CONTINUE these medications which have NOT CHANGED    Details   atenolol (TENORMIN) 25 mg tablet Take 25 mg by mouth daily, Historical Med      dicyclomine (BENTYL) 20 mg tablet Take 1 tablet (20 mg total) by mouth 2 (two) times a day, Starting Tue 6/13/2023, Normal      omeprazole (PriLOSEC) 20 mg delayed release capsule Take 20 mg by mouth if needed, Historical Med      ondansetron (ZOFRAN-ODT) 4 mg disintegrating tablet Take 1 tablet (4 mg total) by mouth every 6 (six) hours as needed for nausea or vomiting, Starting Tue 6/13/2023, Normal           No discharge procedures on file.  ED SEPSIS DOCUMENTATION   Time reflects when diagnosis was documented in both MDM as applicable and the Disposition within this note       Time User Action Codes Description Comment    9/29/2024  5:10 PM Ervin Burr [R07.9] Chest pain     9/29/2024  5:10 PM Ervin Burr Add [F41.9] Anxiety     9/29/2024  5:11 PM Ervin Burr Remove [F41.9] Anxiety                  Ervin Burr MD  09/29/24 2010

## 2024-09-30 LAB
ATRIAL RATE: 82 BPM
P AXIS: 42 DEGREES
PR INTERVAL: 146 MS
QRS AXIS: 22 DEGREES
QRSD INTERVAL: 90 MS
QT INTERVAL: 358 MS
QTC INTERVAL: 418 MS
T WAVE AXIS: 28 DEGREES
VENTRICULAR RATE: 82 BPM

## 2024-09-30 PROCEDURE — 93010 ELECTROCARDIOGRAM REPORT: CPT | Performed by: INTERNAL MEDICINE

## 2024-11-27 ENCOUNTER — APPOINTMENT (EMERGENCY)
Dept: RADIOLOGY | Facility: HOSPITAL | Age: 34
End: 2024-11-27
Payer: COMMERCIAL

## 2024-11-27 ENCOUNTER — HOSPITAL ENCOUNTER (EMERGENCY)
Facility: HOSPITAL | Age: 34
Discharge: HOME | End: 2024-11-27
Attending: EMERGENCY MEDICINE
Payer: COMMERCIAL

## 2024-11-27 VITALS
TEMPERATURE: 98.8 F | OXYGEN SATURATION: 96 % | SYSTOLIC BLOOD PRESSURE: 114 MMHG | HEART RATE: 60 BPM | RESPIRATION RATE: 18 BRPM | DIASTOLIC BLOOD PRESSURE: 58 MMHG

## 2024-11-27 DIAGNOSIS — R55 NEAR SYNCOPE: Primary | ICD-10-CM

## 2024-11-27 DIAGNOSIS — R42 LIGHTHEADEDNESS: ICD-10-CM

## 2024-11-27 DIAGNOSIS — R07.9 CHEST PAIN: ICD-10-CM

## 2024-11-27 LAB
2HR DELTA HS TROPONIN: >1 NG/L
ALBUMIN SERPL BCG-MCNC: 4.8 G/DL (ref 3.5–5)
ALP SERPL-CCNC: 59 U/L (ref 34–104)
ALT SERPL W P-5'-P-CCNC: 55 U/L (ref 7–52)
ANION GAP SERPL CALCULATED.3IONS-SCNC: 9 MMOL/L (ref 4–13)
AST SERPL W P-5'-P-CCNC: 30 U/L (ref 13–39)
BASOPHILS # BLD AUTO: 0.03 THOUSANDS/ΜL (ref 0–0.1)
BASOPHILS NFR BLD AUTO: 0 % (ref 0–1)
BILIRUB SERPL-MCNC: 0.78 MG/DL (ref 0.2–1)
BUN SERPL-MCNC: 15 MG/DL (ref 5–25)
CALCIUM SERPL-MCNC: 9.7 MG/DL (ref 8.4–10.2)
CARDIAC TROPONIN I PNL SERPL HS: 3 NG/L (ref ?–50)
CARDIAC TROPONIN I PNL SERPL HS: <2 NG/L (ref ?–50)
CHLORIDE SERPL-SCNC: 100 MMOL/L (ref 96–108)
CO2 SERPL-SCNC: 27 MMOL/L (ref 21–32)
CREAT SERPL-MCNC: 0.87 MG/DL (ref 0.6–1.3)
D DIMER PPP FEU-MCNC: <0.27 UG/ML FEU
EOSINOPHIL # BLD AUTO: 0.07 THOUSAND/ΜL (ref 0–0.61)
EOSINOPHIL NFR BLD AUTO: 1 % (ref 0–6)
ERYTHROCYTE [DISTWIDTH] IN BLOOD BY AUTOMATED COUNT: 12.2 % (ref 11.6–15.1)
FLUAV RNA RESP QL NAA+PROBE: NEGATIVE
FLUBV RNA RESP QL NAA+PROBE: NEGATIVE
GAS + CO PNL BLDA: 2.8 % (ref 0–1.5)
GFR SERPL CREATININE-BSD FRML MDRD: 113 ML/MIN/1.73SQ M
GLUCOSE SERPL-MCNC: 89 MG/DL (ref 65–140)
HCT VFR BLD AUTO: 44.8 % (ref 36.5–49.3)
HGB BLD-MCNC: 15.9 G/DL (ref 12–17)
IMM GRANULOCYTES # BLD AUTO: 0.02 THOUSAND/UL (ref 0–0.2)
IMM GRANULOCYTES NFR BLD AUTO: 0 % (ref 0–2)
LYMPHOCYTES # BLD AUTO: 3.6 THOUSANDS/ΜL (ref 0.6–4.47)
LYMPHOCYTES NFR BLD AUTO: 39 % (ref 14–44)
MCH RBC QN AUTO: 29.3 PG (ref 26.8–34.3)
MCHC RBC AUTO-ENTMCNC: 35.5 G/DL (ref 31.4–37.4)
MCV RBC AUTO: 83 FL (ref 82–98)
MONOCYTES # BLD AUTO: 0.53 THOUSAND/ΜL (ref 0.17–1.22)
MONOCYTES NFR BLD AUTO: 6 % (ref 4–12)
NEUTROPHILS # BLD AUTO: 4.96 THOUSANDS/ΜL (ref 1.85–7.62)
NEUTS SEG NFR BLD AUTO: 54 % (ref 43–75)
NRBC BLD AUTO-RTO: 0 /100 WBCS
PLATELET # BLD AUTO: 260 THOUSANDS/UL (ref 149–390)
PMV BLD AUTO: 9.7 FL (ref 8.9–12.7)
POTASSIUM SERPL-SCNC: 3.8 MMOL/L (ref 3.5–5.3)
PROT SERPL-MCNC: 7.4 G/DL (ref 6.4–8.4)
RBC # BLD AUTO: 5.42 MILLION/UL (ref 3.88–5.62)
RSV RNA RESP QL NAA+PROBE: NEGATIVE
SARS-COV-2 RNA RESP QL NAA+PROBE: NEGATIVE
SODIUM SERPL-SCNC: 136 MMOL/L (ref 135–147)
TSH SERPL DL<=0.05 MIU/L-ACNC: 1.5 UIU/ML (ref 0.45–4.5)
WBC # BLD AUTO: 9.21 THOUSAND/UL (ref 4.31–10.16)

## 2024-11-27 PROCEDURE — 84484 ASSAY OF TROPONIN QUANT: CPT | Performed by: EMERGENCY MEDICINE

## 2024-11-27 PROCEDURE — 85025 COMPLETE CBC W/AUTO DIFF WBC: CPT | Performed by: EMERGENCY MEDICINE

## 2024-11-27 PROCEDURE — 99285 EMERGENCY DEPT VISIT HI MDM: CPT | Performed by: EMERGENCY MEDICINE

## 2024-11-27 PROCEDURE — 80053 COMPREHEN METABOLIC PANEL: CPT | Performed by: EMERGENCY MEDICINE

## 2024-11-27 PROCEDURE — 36415 COLL VENOUS BLD VENIPUNCTURE: CPT | Performed by: EMERGENCY MEDICINE

## 2024-11-27 PROCEDURE — 99284 EMERGENCY DEPT VISIT MOD MDM: CPT

## 2024-11-27 PROCEDURE — 71046 X-RAY EXAM CHEST 2 VIEWS: CPT

## 2024-11-27 PROCEDURE — 0241U HB NFCT DS VIR RESP RNA 4 TRGT: CPT | Performed by: EMERGENCY MEDICINE

## 2024-11-27 PROCEDURE — 96360 HYDRATION IV INFUSION INIT: CPT

## 2024-11-27 PROCEDURE — 82375 ASSAY CARBOXYHB QUANT: CPT | Performed by: EMERGENCY MEDICINE

## 2024-11-27 PROCEDURE — 84443 ASSAY THYROID STIM HORMONE: CPT | Performed by: EMERGENCY MEDICINE

## 2024-11-27 PROCEDURE — 85379 FIBRIN DEGRADATION QUANT: CPT | Performed by: EMERGENCY MEDICINE

## 2024-11-27 PROCEDURE — 96361 HYDRATE IV INFUSION ADD-ON: CPT

## 2024-11-27 PROCEDURE — 93005 ELECTROCARDIOGRAM TRACING: CPT

## 2024-11-27 RX ORDER — SODIUM CHLORIDE 9 MG/ML
3 INJECTION INTRAVENOUS
Status: DISCONTINUED | OUTPATIENT
Start: 2024-11-27 | End: 2024-11-27 | Stop reason: HOSPADM

## 2024-11-27 RX ADMIN — SODIUM CHLORIDE 1000 ML: 0.9 INJECTION, SOLUTION INTRAVENOUS at 16:51

## 2024-11-28 LAB
ATRIAL RATE: 64 BPM
ATRIAL RATE: 80 BPM
P AXIS: 50 DEGREES
P AXIS: 68 DEGREES
PR INTERVAL: 132 MS
PR INTERVAL: 148 MS
QRS AXIS: 55 DEGREES
QRS AXIS: 76 DEGREES
QRSD INTERVAL: 84 MS
QRSD INTERVAL: 88 MS
QT INTERVAL: 380 MS
QT INTERVAL: 396 MS
QTC INTERVAL: 408 MS
QTC INTERVAL: 438 MS
T WAVE AXIS: 34 DEGREES
T WAVE AXIS: 9 DEGREES
VENTRICULAR RATE: 64 BPM
VENTRICULAR RATE: 80 BPM

## 2024-11-28 PROCEDURE — 93010 ELECTROCARDIOGRAM REPORT: CPT | Performed by: INTERNAL MEDICINE

## 2024-11-28 NOTE — ED PROVIDER NOTES
Time reflects when diagnosis was documented in both MDM as applicable and the Disposition within this note       Time User Action Codes Description Comment    11/27/2024  7:42 PM Leigh Ann Artis Add [R55] Near syncope     11/27/2024  7:42 PM Leigh Ann Artis Add [R42] Lightheadedness     11/27/2024  7:43 PM Leigh Ann Artis Add [R07.9] Chest pain           ED Disposition       ED Disposition   Discharge    Condition   Stable    Date/Time   Wed Nov 27, 2024  7:42 PM    Comment   Harrison Ramires discharge to home/self care.                   Assessment & Plan       Medical Decision Making  34-year-old male with lightheadedness and chest pain-will get cardiac workup, covid/ flu, tsh, ddimer, and give fluids/ reassess.     Patient reevaluated and feels improved.  Will discharge patient home and instruct follow-up with his PCP.  Patient also instructed to continue with outpatient workup that was ordered by his PCP.  Return precautions given.    Amount and/or Complexity of Data Reviewed  Labs: ordered.  Radiology: ordered.        ED Course as of 11/28/24 0106   Wed Nov 27, 2024   1642 Sat night- started feeling lightheaded. Works as  but was wearing gear. Since then has had continued lightheaded, metalic taste, intermittent chest pain. Has had similar 2 months ago and seen at [facility] and nothing was found. No syncopal episodes            Medications   sodium chloride 0.9 % bolus 1,000 mL (0 mL Intravenous Stopped 11/27/24 1857)       ED Risk Strat Scores                           SBIRT 22yo+      Flowsheet Row Most Recent Value   Initial Alcohol Screen: US AUDIT-C     1. How often do you have a drink containing alcohol? 0 Filed at: 11/27/2024 1632   2. How many drinks containing alcohol do you have on a typical day you are drinking?  0 Filed at: 11/27/2024 1632   3a. Male UNDER 65: How often do you have five or more drinks on one occasion? 0 Filed at: 11/27/2024 1632   Audit-C Score 0 Filed at:  "11/27/2024 1632   JOSEFINA: How many times in the past year have you...    Used an illegal drug or used a prescription medication for non-medical reasons? Never Filed at: 11/27/2024 1632                            History of Present Illness       Chief Complaint   Patient presents with    Syncope     C/o near syncopal episodes over the last few days as well as c/o \" a metal taste in my mouth\" with intermittent CP        Past Medical History:   Diagnosis Date    GERD (gastroesophageal reflux disease)     Hypertension     Lumbar herniated disc     x 3      History reviewed. No pertinent surgical history.   History reviewed. No pertinent family history.   Social History     Tobacco Use    Smoking status: Never    Smokeless tobacco: Current     Types: Chew   Vaping Use    Vaping status: Never Used   Substance Use Topics    Alcohol use: No    Drug use: No      E-Cigarette/Vaping    E-Cigarette Use Never User       E-Cigarette/Vaping Substances      I have reviewed and agree with the history as documented.     33 y/o male presents to the ED for lightheadedness and chest pain, states that symptoms started 5 days ago.  States that he feels like he is going to pass out but denies any syncopal episodes.  He also reports that he has a metallic taste in his mouth and has had intermittent chest pain.  States that he had similar 2 months ago and was seen at another [facility] where he had a workup done and it was normal.  He states that he did follow-up with his family doctor today and has outpatient testing ordered. He denies any fever, sob, abd pain, n/v, d/c, or urinary symptoms. Denies any known sick contacts.  Does state that he works as a  but wears his gear at all times.  Denies any smoking history.  No other complaints.      History provided by:  Patient      Review of Systems   Constitutional:  Negative for chills and fever.   HENT:  Negative for congestion, ear pain and sore throat.    Eyes:  Negative for " pain and visual disturbance.   Respiratory:  Negative for cough, shortness of breath and wheezing.    Cardiovascular:  Positive for chest pain. Negative for leg swelling.   Gastrointestinal:  Negative for abdominal pain, diarrhea, nausea and vomiting.   Genitourinary:  Negative for dysuria, frequency, hematuria and urgency.   Musculoskeletal:  Negative for neck pain and neck stiffness.   Skin:  Negative for rash and wound.   Neurological:  Positive for light-headedness. Negative for weakness, numbness and headaches.   Psychiatric/Behavioral:  Negative for agitation and confusion.    All other systems reviewed and are negative.          Objective       ED Triage Vitals [11/27/24 1630]   Temperature Pulse Blood Pressure Respirations SpO2 Patient Position - Orthostatic VS   98.8 °F (37.1 °C) 63 144/81 22 98 % Sitting      Temp src Heart Rate Source BP Location FiO2 (%) Pain Score    -- Monitor Left arm -- --      Vitals      Date and Time Temp Pulse SpO2 Resp BP Pain Score FACES Pain Rating User   11/27/24 1900 -- 60 96 % 18 114/58 -- -- KL   11/27/24 1830 -- 64 94 % 19 123/63 -- -- RS   11/27/24 1730 -- 64 96 % 17 129/72 -- -- RS   11/27/24 1700 -- 62 98 % 16 127/70 -- -- RS   11/27/24 1630 98.8 °F (37.1 °C) 63 98 % 22 144/81 -- -- AS            Physical Exam  Vitals and nursing note reviewed.   Constitutional:       Appearance: He is well-developed.   HENT:      Head: Normocephalic and atraumatic.   Eyes:      Pupils: Pupils are equal, round, and reactive to light.   Cardiovascular:      Rate and Rhythm: Normal rate and regular rhythm.   Pulmonary:      Effort: Pulmonary effort is normal.      Breath sounds: Normal breath sounds.   Abdominal:      General: Bowel sounds are normal.      Palpations: Abdomen is soft.      Tenderness: There is no abdominal tenderness.   Musculoskeletal:         General: Normal range of motion.      Cervical back: Normal range of motion and neck supple.   Skin:     General: Skin is warm  and dry.   Neurological:      General: No focal deficit present.      Mental Status: He is alert and oriented to person, place, and time.      Comments: No focal deficits         Results Reviewed       Procedure Component Value Units Date/Time    HS Troponin I 2hr [[id number]]  (Normal) Collected: 11/27/24 1857    Lab Status: Final result Specimen: Blood from Arm, Right Updated: 11/27/24 1925     hs TnI 2hr 3 ng/L      Delta 2hr hsTnI >1 ng/L     FLU/RSV/COVID - if FLU/RSV clinically relevant (2hr TAT) [[id number]]  (Normal) Collected: 11/27/24 1650    Lab Status: Final result Specimen: Nares from Nose Updated: 11/27/24 1818     SARS-CoV-2 Negative     INFLUENZA A PCR Negative     INFLUENZA B PCR Negative     RSV PCR Negative    Narrative:      This test has been performed using the CoV-2/Flu/RSV plus assay on the [employer] platform. This test has been validated by the  and verified by the performing laboratory.     This test is designed to amplify and detect the following: nucleocapsid (N), envelope (E), and RNA-dependent RNA polymerase (RdRP) genes of the SARS-CoV-2 genome; matrix (M), basic polymerase (PB2), and acidic protein (PA) segments of the influenza A genome; matrix (M) and non-structural protein (NS) segments of the influenza B genome, and the nucleocapsid genes of RSV A and RSV B.     Positive results are indicative of the presence of Flu A, Flu B, RSV, and/or SARS-CoV-2 RNA. Positive results for SARS-CoV-2 or suspected novel influenza should be reported to state, local, or federal health departments according to local reporting requirements.      All results should be assessed in conjunction with clinical presentation and other laboratory markers for clinical management.     FOR PEDIATRIC PATIENTS - copy/paste COVID Guidelines URL to browser: [email or web address]       TSH, 3rd generation with Free T4 reflex [[id number]]  (Normal)  Collected: 11/27/24 1650    Lab Status: Final result Specimen: Blood from Arm, Right Updated: 11/27/24 1729     TSH 3RD GENERATON 1.502 uIU/mL     HS Troponin 0hr (reflex protocol) [[id number]]  (Normal) Collected: 11/27/24 1650    Lab Status: Final result Specimen: Blood from Arm, Right Updated: 11/27/24 1720     hs TnI 0hr <2 ng/L     Comprehensive metabolic panel [[id number]]  (Abnormal) Collected: 11/27/24 1650    Lab Status: Final result Specimen: Blood from Arm, Right Updated: 11/27/24 1717     Sodium 136 mmol/L      Potassium 3.8 mmol/L      Chloride 100 mmol/L      CO2 27 mmol/L      ANION GAP 9 mmol/L      BUN 15 mg/dL      Creatinine 0.87 mg/dL      Glucose 89 mg/dL      Calcium 9.7 mg/dL      AST 30 U/L      ALT 55 U/L      Alkaline Phosphatase 59 U/L      Total Protein 7.4 g/dL      Albumin 4.8 g/dL      Total Bilirubin 0.78 mg/dL      eGFR 113 ml/min/1.73sq m     Narrative:      National Kidney Disease Foundation guidelines for Chronic Kidney Disease (CKD):     Stage 1 with normal or high GFR (GFR > 90 mL/min/1.73 square meters)    Stage 2 Mild CKD (GFR = 60-89 mL/min/1.73 square meters)    Stage 3A Moderate CKD (GFR = 45-59 mL/min/1.73 square meters)    Stage 3B Moderate CKD (GFR = 30-44 mL/min/1.73 square meters)    Stage 4 Severe CKD (GFR = 15-29 mL/min/1.73 square meters)    Stage 5 End Stage CKD (GFR <15 mL/min/1.73 square meters)  Note: GFR calculation is accurate only with a steady state creatinine    Carboxyhemoglobin [[id number]]  (Abnormal) Collected: 11/27/24 1650    Lab Status: Final result Specimen: Blood from Arm, Right Updated: 11/27/24 1716     Carbon Monoxide, Blood 2.8 %     Narrative:      Therapeutic levels (1 mg/mL and 2 mg/mL) of hydroxocobalamin may interfere with the fCOHb and fMetHb where it may cause lower than expected values  Normal Carboxyhemoglobin range for nonsmokers is <1.5%   Normal Carboxyhemoglobin range for smokers is 1.5% to 5.1%     D-dimer, quantitative [[id number]]   (Normal) Collected: 11/27/24 1650    Lab Status: Final result Specimen: Blood from Arm, Right Updated: 11/27/24 1714     D-Dimer, Quant <0.27 ug/ml FEU     CBC and differential [[id number]] Collected: 11/27/24 1650    Lab Status: Final result Specimen: Blood from Arm, Right Updated: 11/27/24 1657     WBC 9.21 Thousand/uL      RBC 5.42 Million/uL      Hemoglobin 15.9 g/dL      Hematocrit 44.8 %      MCV 83 fL      MCH 29.3 pg      MCHC 35.5 g/dL      RDW 12.2 %      MPV 9.7 fL      Platelets 260 Thousands/uL      nRBC 0 /100 WBCs      Segmented % 54 %      Immature Grans % 0 %      Lymphocytes % 39 %      Monocytes % 6 %      Eosinophils Relative 1 %      Basophils Relative 0 %      Absolute Neutrophils 4.96 Thousands/µL      Absolute Immature Grans 0.02 Thousand/uL      Absolute Lymphocytes 3.60 Thousands/µL      Absolute Monocytes 0.53 Thousand/µL      Eosinophils Absolute 0.07 Thousand/µL      Basophils Absolute 0.03 Thousands/µL             X-ray chest 2 views   Final Interpretation by Doretha Figueroa MD (11/27 2108)      No acute pulmonary pathology.               Workstation performed: [id number]             ECG 12 Lead Documentation Only    Date/Time: 11/27/2024 4:35 PM    Performed by: Leigh Ann Artis DO  Authorized by: Leigh Ann Artis DO    Indications / Diagnosis:  Jeremy pain  Patient location:  ED  Rate:     ECG rate:  80    ECG rate assessment: normal    Rhythm:     Rhythm: sinus rhythm    Ectopy:     Ectopy: none    QRS:     QRS axis:  Normal    QRS intervals:  Normal  ST segments:     ST segments:  Normal  T waves:     T waves: normal    ECG 12 Lead Documentation Only    Date/Time: 11/27/2024 6:55 PM    Performed by: Leigh Ann Artis DO  Authorized by: Leigh Ann Artis DO    Indications / Diagnosis:  Delta  Patient location:  ED  Rate:     ECG rate:  64    ECG rate assessment: normal    Rhythm:     Rhythm: sinus rhythm    Ectopy:     Ectopy: none    QRS:     QRS axis:  Normal    QRS intervals:   Normal  ST segments:     ST segments:  Normal  T waves:     T waves: normal        ED Medication and Procedure Management   Prior to Admission Medications   Prescriptions Last Dose Informant Patient Reported? Taking?   atenolol (TENORMIN) 25 mg tablet   Yes No   Sig: Take 25 mg by mouth daily   dicyclomine (BENTYL) 20 mg tablet   No No   Sig: Take 1 tablet (20 mg total) by mouth 2 (two) times a day   Patient not taking: Reported on 9/29/2024   omeprazole (PriLOSEC) 20 mg delayed release capsule   Yes No   Sig: Take 20 mg by mouth if needed   ondansetron (ZOFRAN-ODT) 4 mg disintegrating tablet   No No   Sig: Take 1 tablet (4 mg total) by mouth every 6 (six) hours as needed for nausea or vomiting   Patient not taking: Reported on 9/29/2024      Facility-Administered Medications: None     Discharge Medication List as of 11/27/2024  7:44 PM        CONTINUE these medications which have NOT CHANGED    Details   atenolol (TENORMIN) 25 mg tablet Take 25 mg by mouth daily, Historical Med      dicyclomine (BENTYL) 20 mg tablet Take 1 tablet (20 mg total) by mouth 2 (two) times a day, Starting Tue 6/13/2023, Normal      omeprazole (PriLOSEC) 20 mg delayed release capsule Take 20 mg by mouth if needed, Historical Med      ondansetron (ZOFRAN-ODT) 4 mg disintegrating tablet Take 1 tablet (4 mg total) by mouth every 6 (six) hours as needed for nausea or vomiting, Starting Tue 6/13/2023, Normal           No discharge procedures on file.  ED SEPSIS DOCUMENTATION   Time reflects when diagnosis was documented in both MDM as applicable and the Disposition within this note       Time User Action Codes Description Comment    11/27/2024  7:42 PM Leigh Ann Artis Add [R55] Near syncope     11/27/2024  7:42 PM Leigh Ann Artis Add [R42] Lightheadedness     11/27/2024  7:43 PM Leigh Ann Artis Add [R07.9] Chest pain                  Leigh Ann Artis, DO  11/28/24 0106

## 2025-01-16 ENCOUNTER — EVALUATION (OUTPATIENT)
Dept: PHYSICAL THERAPY | Facility: MEDICAL CENTER | Age: 35
End: 2025-01-16
Payer: COMMERCIAL

## 2025-01-16 DIAGNOSIS — G89.29 CHRONIC MIDLINE LOW BACK PAIN, UNSPECIFIED WHETHER SCIATICA PRESENT: Primary | ICD-10-CM

## 2025-01-16 DIAGNOSIS — M54.50 CHRONIC MIDLINE LOW BACK PAIN, UNSPECIFIED WHETHER SCIATICA PRESENT: Primary | ICD-10-CM

## 2025-01-16 PROCEDURE — 97112 NEUROMUSCULAR REEDUCATION: CPT | Performed by: PHYSICAL THERAPIST

## 2025-01-16 PROCEDURE — 97161 PT EVAL LOW COMPLEX 20 MIN: CPT | Performed by: PHYSICAL THERAPIST

## 2025-01-16 NOTE — LETTER
2025    Suman Joe MD  [address] [facility] [address]    Patient: Harrison Ramires   YOB: 1990   Date of Visit: 2025     Encounter Diagnosis     ICD-10-CM    1. Chronic midline low back pain, unspecified whether sciatica present  M54.50     G89.29           Dear Dr. Joe:    Thank you for your recent referral of Harrison Ramires. Please review the attached evaluation summary from Harrison's recent visit.     Please verify that you agree with the plan of care by signing the attached order.     If you have any questions or concerns, please do not hesitate to call.     I sincerely appreciate the opportunity to share in the care of one of your patients and hope to have another opportunity to work with you in the near future.       Sincerely,    Avel Cardoso, PT      Referring Provider:      I certify that I have read the below Plan of Care and certify the need for these services furnished under this plan of treatment while under my care.                    Suman Joe MD  [address] [facility] [address]  Via Fax: [phone]          PT Evaluation     Today's date: 2025  Patient name: Harrison Ramires  : 1990  MRN: [id number]  Referring provider: Suman Joe MD  Dx:   Encounter Diagnosis     ICD-10-CM    1. Chronic midline low back pain, unspecified whether sciatica present  M54.50     G89.29           Start Time: 1456  Stop Time: 1535  Total time in clinic (min): 39 minutes    Assessment  Impairments: abnormal muscle tone, abnormal or restricted ROM, abnormal movement, activity intolerance, impaired balance, impaired physical strength, lacks appropriate home exercise program, pain with function, poor posture , participation limitations, activity limitations and endurance  Symptom irritability: low    Assessment details: Pt is a 34 y.o. male who presents to OP PT with increased low back pain, decreased lumbar ROM, decreased LE strength and  decreased activity tolerance. These impairments limit the patient from participating in completing functional activity such as getting in and out of a truck, decreased tolerance to performing work related activity and decreased ability to participate in the community.  I believe this patient is a good candidate for and will benefit from skilled physical therapy for manual therapy to the L/S, ROM exercises, strengthening exercises and mechanics training to improve limitations and assist the patient to return to PLOF.  Thank you for the opportunity to participate in Harrison Ramires's care.    Positive Prognostic Indicators: desire to improve    Negative Prognostic Indicators: chronicity of symptoms.        Understanding of Dx/Px/POC: good     Prognosis: good    Goals  STGs: 4 weeks  1) Pt will have SPR decrease of 2 units at worst  2) pt will have improved lumbar extension to minimally limited  3) pt will have improved foto score of 10 points    LTGs: 8 weeks  1) pt will be independent with HEP by D/C  2) pt will be independent with symptom management by D/C  3) pt will subjectively report improved tolerance to performance of work related activity by DC.       Plan  Patient would benefit from: skilled physical therapy  Planned modality interventions: cryotherapy and thermotherapy: hydrocollator packs    Planned therapy interventions: joint mobilization, manual therapy, neuromuscular re-education, patient/caregiver education, postural training, strengthening, stretching, therapeutic activities, therapeutic exercise and home exercise program    Frequency: 1-2x week  Duration in weeks: 12  Plan of Care beginning date: 1/16/2025  Plan of Care expiration date: 4/10/2025  Treatment plan discussed with: patient        Subjective Evaluation    History of Present Illness  Mechanism of injury: Subjective Comments: pt reports that he has 3 herniated discs in his back.  Reports that he has been having reoccurrence of pain in  his back but reports that this has settled down for a while.  Reports that being active for a long time will bother him.  Reports that around thanksgiving he re-herniated a disc and was prescribed PT. Reports that since then symptoms have improved.  Usual treatment would be laying up with ice for a couple days.   Pt reports that today is a good day. Reports that symptoms in the morning are good, at the end of the day are worse. Pt reports that pain is right in the center. Reports that when symptoms are bad they will go down his legs. At times, will report N&T in his legs when pain is elevated.   Denies changes in B&B, denies tripping falls or weakness, will have increased pain with sneezing.     Pain   Rest: 0/10   Best: 0/10   Worst: 7/10    Relieving Factors: laying down on back, changing position    Exacerbating Factors: straightening back,     Sleeping: independent    Home Set-up: independent    ADLs: independent but is careful when bending.     Work/Hobbies: , very active job, lifting and carrying, a lot of driving. Reports that driving is not too bad. Reports that his symptom onset was when he was getting out of a truck    Previous Treatment: initial injury in 2012- 9 months of PT. Medication, tried an injection and didn't help.     Goals:  get back to baseline.     Reports that his initial injury was lifting a boat trailer.                Objective     Neurological Testing     Sensation     Lumbar   Left   Intact: light touch    Right   Intact: light touch    Reflexes   Left   Patellar (L4): normal (2+)  Achilles (S1): normal (2+)    Right   Patellar (L4): normal (2+)  Achilles (S1): normal (2+)    Active Range of Motion     Lumbar   Flexion:  WFL  Extension:  with pain Restriction level: maximal  Left lateral flexion:  WFL  Right lateral flexion:  WFL    Passive Range of Motion   Left Hip   Normal passive range of motion  Internal rotation (90/90): 15 degrees     Right Hip   Normal passive  "range of motion  Internal rotation (90/90): 15 degrees     Strength/Myotome Testing     Left Hip   Planes of Motion   Flexion: 4+  Abduction: 4+  Adduction: 4+    Right Hip   Planes of Motion   Flexion: 4+  Abduction: 4+  Adduction: 4+    Left Knee   Flexion: 4+  Extension: 4+    Right Knee   Flexion: 4+  Extension: 5    Left Ankle/Foot   Dorsiflexion: 5  Plantar flexion: 5  Great toe extension: 5    Right Ankle/Foot   Dorsiflexion: 5  Plantar flexion: 5  Great toe extension: 5    Ambulation     Ambulation: Level Surfaces   Ambulation without assistive device: independent    Ambulation: Stairs   Ascend stairs: independent  Pattern: reciprocal  Railings: without rails  Descend stairs: independent  Pattern: reciprocal  Railings: without rails    Observational Gait   Walking speed and stride length within functional limits.              Precautions:   There is no problem list on file for this patient.    Past Medical History:   Diagnosis Date   • GERD (gastroesophageal reflux disease)    • Hypertension    • Lumbar herniated disc     x 3     History reviewed. No pertinent surgical history.  Eval/Re-Eval POC Expires Auth #/ Referral # Total Visits Start Date Expiration Date Extension Info Visits Limitation   1/16 4/10                                                               1 2 3 4 5 6   1/16        7 8 9 10 11 12           13 14 15 16 17 18           19 20 21 22 23 24           25 26 27 28 29 30                 Manuals 1/16            PA jt mobs L/S                                                    Neuro Re-Ed             PPT 5\" x10            TAB+ bridges x10            TAB clamshells X10 ea.            TAB+ SLR X10 ea.                                                   Ther Ex             bike             PPU?             HSS                                                                              Ther Activity                                       Gait Training                                     "   Modalities

## 2025-01-16 NOTE — PROGRESS NOTES
PT Evaluation     Today's date: 2025  Patient name: Harrison Ramires  : 1990  MRN: [id number]  Referring provider: Suman Joe MD  Dx:   Encounter Diagnosis     ICD-10-CM    1. Chronic midline low back pain, unspecified whether sciatica present  M54.50     G89.29           Start Time: 1456  Stop Time: 1535  Total time in clinic (min): 39 minutes    Assessment  Impairments: abnormal muscle tone, abnormal or restricted ROM, abnormal movement, activity intolerance, impaired balance, impaired physical strength, lacks appropriate home exercise program, pain with function, poor posture , participation limitations, activity limitations and endurance  Symptom irritability: low    Assessment details: Pt is a 34 y.o. male who presents to OP PT with increased low back pain, decreased lumbar ROM, decreased LE strength and decreased activity tolerance. These impairments limit the patient from participating in completing functional activity such as getting in and out of a truck, decreased tolerance to performing work related activity and decreased ability to participate in the community.  I believe this patient is a good candidate for and will benefit from skilled physical therapy for manual therapy to the L/S, ROM exercises, strengthening exercises and mechanics training to improve limitations and assist the patient to return to PLOF.  Thank you for the opportunity to participate in Harrison Ramires's care.    Positive Prognostic Indicators: desire to improve    Negative Prognostic Indicators: chronicity of symptoms.        Understanding of Dx/Px/POC: good     Prognosis: good    Goals  STGs: 4 weeks  1) Pt will have SPR decrease of 2 units at worst  2) pt will have improved lumbar extension to minimally limited  3) pt will have improved foto score of 10 points    LTGs: 8 weeks  1) pt will be independent with HEP by D/C  2) pt will be independent with symptom management by D/C  3) pt will subjectively report  improved tolerance to performance of work related activity by DC.       Plan  Patient would benefit from: skilled physical therapy  Planned modality interventions: cryotherapy and thermotherapy: hydrocollator packs    Planned therapy interventions: joint mobilization, manual therapy, neuromuscular re-education, patient/caregiver education, postural training, strengthening, stretching, therapeutic activities, therapeutic exercise and home exercise program    Frequency: 1-2x week  Duration in weeks: 12  Plan of Care beginning date: 1/16/2025  Plan of Care expiration date: 4/10/2025  Treatment plan discussed with: patient        Subjective Evaluation    History of Present Illness  Mechanism of injury: Subjective Comments: pt reports that he has 3 herniated discs in his back.  Reports that he has been having reoccurrence of pain in his back but reports that this has settled down for a while.  Reports that being active for a long time will bother him.  Reports that around thanksgiving he re-herniated a disc and was prescribed PT. Reports that since then symptoms have improved.  Usual treatment would be laying up with ice for a couple days.   Pt reports that today is a good day. Reports that symptoms in the morning are good, at the end of the day are worse. Pt reports that pain is right in the center. Reports that when symptoms are bad they will go down his legs. At times, will report N&T in his legs when pain is elevated.   Denies changes in B&B, denies tripping falls or weakness, will have increased pain with sneezing.     Pain   Rest: 0/10   Best: 0/10   Worst: 7/10    Relieving Factors: laying down on back, changing position    Exacerbating Factors: straightening back,     Sleeping: independent    Home Set-up: independent    ADLs: independent but is careful when bending.     Work/Hobbies: , very active job, lifting and carrying, a lot of driving. Reports that driving is not too bad. Reports that his  symptom onset was when he was getting out of a truck    Previous Treatment: initial injury in 2012- 9 months of PT. Medication, tried an injection and didn't help.     Goals:  get back to baseline.     Reports that his initial injury was lifting a boat trailer.                Objective     Neurological Testing     Sensation     Lumbar   Left   Intact: light touch    Right   Intact: light touch    Reflexes   Left   Patellar (L4): normal (2+)  Achilles (S1): normal (2+)    Right   Patellar (L4): normal (2+)  Achilles (S1): normal (2+)    Active Range of Motion     Lumbar   Flexion:  WFL  Extension:  with pain Restriction level: maximal  Left lateral flexion:  WFL  Right lateral flexion:  WFL    Passive Range of Motion   Left Hip   Normal passive range of motion  Internal rotation (90/90): 15 degrees     Right Hip   Normal passive range of motion  Internal rotation (90/90): 15 degrees     Strength/Myotome Testing     Left Hip   Planes of Motion   Flexion: 4+  Abduction: 4+  Adduction: 4+    Right Hip   Planes of Motion   Flexion: 4+  Abduction: 4+  Adduction: 4+    Left Knee   Flexion: 4+  Extension: 4+    Right Knee   Flexion: 4+  Extension: 5    Left Ankle/Foot   Dorsiflexion: 5  Plantar flexion: 5  Great toe extension: 5    Right Ankle/Foot   Dorsiflexion: 5  Plantar flexion: 5  Great toe extension: 5    Ambulation     Ambulation: Level Surfaces   Ambulation without assistive device: independent    Ambulation: Stairs   Ascend stairs: independent  Pattern: reciprocal  Railings: without rails  Descend stairs: independent  Pattern: reciprocal  Railings: without rails    Observational Gait   Walking speed and stride length within functional limits.              Precautions:   There is no problem list on file for this patient.    Past Medical History:   Diagnosis Date    GERD (gastroesophageal reflux disease)     Hypertension     Lumbar herniated disc     x 3     History reviewed. No pertinent surgical  "history.  Eval/Re-Eval POC Expires Auth #/ Referral # Total Visits Start Date Expiration Date Extension Info Visits Limitation   1/16 4/10                                                               1 2 3 4 5 6   1/16        7 8 9 10 11 12           13 14 15 16 17 18           19 20 21 22 23 24           25 26 27 28 29 30                 Manuals 1/16            PA jt mobs L/S                                                    Neuro Re-Ed             PPT 5\" x10            TAB+ bridges x10            TAB clamshells X10 ea.            TAB+ SLR X10 ea.                                                   Ther Ex             bike             PPU?             HSS                                                                              Ther Activity                                       Gait Training                                       Modalities                                            "

## 2025-01-23 ENCOUNTER — OFFICE VISIT (OUTPATIENT)
Dept: PHYSICAL THERAPY | Facility: MEDICAL CENTER | Age: 35
End: 2025-01-23
Payer: COMMERCIAL

## 2025-01-23 DIAGNOSIS — G89.29 CHRONIC MIDLINE LOW BACK PAIN, UNSPECIFIED WHETHER SCIATICA PRESENT: Primary | ICD-10-CM

## 2025-01-23 DIAGNOSIS — M54.50 CHRONIC MIDLINE LOW BACK PAIN, UNSPECIFIED WHETHER SCIATICA PRESENT: Primary | ICD-10-CM

## 2025-01-23 PROCEDURE — 97140 MANUAL THERAPY 1/> REGIONS: CPT | Performed by: PHYSICAL THERAPIST

## 2025-01-23 PROCEDURE — 97112 NEUROMUSCULAR REEDUCATION: CPT | Performed by: PHYSICAL THERAPIST

## 2025-01-23 PROCEDURE — 97110 THERAPEUTIC EXERCISES: CPT | Performed by: PHYSICAL THERAPIST

## 2025-01-23 NOTE — PROGRESS NOTES
"Daily Note     Today's date: 2025  Patient name: Harrison Ramires  : 1990  MRN: [id number]  Referring provider: Suman Joe MD  Dx:   Encounter Diagnosis     ICD-10-CM    1. Chronic midline low back pain, unspecified whether sciatica present  M54.50     G89.29           Start Time: 1624  Stop Time: 1703  Total time in clinic (min): 39 minutes    Subjective: pt reports that he continues to have off and on symptoms. Reports today was a good day and he does not have back pain currently.       Objective: See treatment diary below      Assessment: Tolerated treatment well.  Continued and progressed lumbar and hip ROM exercises. Progressed core strengthening exercises with good tolerance. STM completed post session in order to reduce paraspinal spam/tightness. We will continue to monitor and progress as tolerated. Patient would benefit from continued PT      Plan: Continue per plan of care.      Precautions:   There is no problem list on file for this patient.    Past Medical History:   Diagnosis Date    GERD (gastroesophageal reflux disease)     Hypertension     Lumbar herniated disc     x 3     History reviewed. No pertinent surgical history.  Eval/Re-Eval POC Expires Auth #/ Referral # Total Visits Start Date Expiration Date Extension Info Visits Limitation   1/16 4/10                                                               1 2 3 4 5 6          7 8 9 10 11 12           13 14 15 16 17 18           19 20 21 22 23 24           25 26 27 28 29 30                 Manuals            PA jt mobs L/S             STM L/S  RK                                     Neuro Re-Ed             PPT 5\" x10 5\" x10           TAB+ bridges x10 2x10           TAB + clamshells X10 ea. 2x10 ea.           TAB+ SLR X10 ea. 2x10 ea.           Dead bugs  X10 alt arms, 2x10 alt legs                                     Ther Ex             bike  5'           PPU?             HSS  30\"x3 ea.           Piriformis stretch  " "30\"x3 ea.           LTR  X10 ea.                                                  Ther Activity                                       Gait Training                                       Modalities                                            "

## 2025-01-30 ENCOUNTER — APPOINTMENT (OUTPATIENT)
Dept: PHYSICAL THERAPY | Facility: MEDICAL CENTER | Age: 35
End: 2025-01-30
Payer: COMMERCIAL

## 2025-02-06 ENCOUNTER — APPOINTMENT (OUTPATIENT)
Dept: PHYSICAL THERAPY | Facility: MEDICAL CENTER | Age: 35
End: 2025-02-06
Payer: COMMERCIAL

## 2025-02-13 ENCOUNTER — APPOINTMENT (OUTPATIENT)
Dept: PHYSICAL THERAPY | Facility: MEDICAL CENTER | Age: 35
End: 2025-02-13
Payer: COMMERCIAL

## 2025-02-19 ENCOUNTER — HOSPITAL ENCOUNTER (EMERGENCY)
Facility: HOSPITAL | Age: 35
Discharge: HOME | End: 2025-02-19
Attending: EMERGENCY MEDICINE
Payer: COMMERCIAL

## 2025-02-19 ENCOUNTER — APPOINTMENT (EMERGENCY)
Dept: RADIOLOGY | Facility: HOSPITAL | Age: 35
End: 2025-02-19
Payer: COMMERCIAL

## 2025-02-19 VITALS
BODY MASS INDEX: 34.54 KG/M2 | WEIGHT: 255 LBS | TEMPERATURE: 97.7 F | SYSTOLIC BLOOD PRESSURE: 132 MMHG | HEART RATE: 67 BPM | DIASTOLIC BLOOD PRESSURE: 76 MMHG | HEIGHT: 72 IN | OXYGEN SATURATION: 100 % | RESPIRATION RATE: 16 BRPM

## 2025-02-19 DIAGNOSIS — R06.02 SOB (SHORTNESS OF BREATH): Primary | ICD-10-CM

## 2025-02-19 LAB
ALBUMIN SERPL BCG-MCNC: 4.4 G/DL (ref 3.5–5)
ALP SERPL-CCNC: 58 U/L (ref 34–104)
ALT SERPL W P-5'-P-CCNC: 48 U/L (ref 7–52)
ANION GAP SERPL CALCULATED.3IONS-SCNC: 10 MMOL/L (ref 4–13)
AST SERPL W P-5'-P-CCNC: 28 U/L (ref 13–39)
ATRIAL RATE: 67 BPM
BASOPHILS # BLD AUTO: 0.03 THOUSANDS/ΜL (ref 0–0.1)
BASOPHILS NFR BLD AUTO: 0 % (ref 0–1)
BILIRUB SERPL-MCNC: 0.64 MG/DL (ref 0.2–1)
BNP SERPL-MCNC: 16 PG/ML (ref 0–100)
BUN SERPL-MCNC: 18 MG/DL (ref 5–25)
CALCIUM SERPL-MCNC: 9.4 MG/DL (ref 8.4–10.2)
CARDIAC TROPONIN I PNL SERPL HS: <2 NG/L (ref ?–50)
CHLORIDE SERPL-SCNC: 102 MMOL/L (ref 96–108)
CO2 SERPL-SCNC: 24 MMOL/L (ref 21–32)
CREAT SERPL-MCNC: 0.9 MG/DL (ref 0.6–1.3)
EOSINOPHIL # BLD AUTO: 0.1 THOUSAND/ΜL (ref 0–0.61)
EOSINOPHIL NFR BLD AUTO: 2 % (ref 0–6)
ERYTHROCYTE [DISTWIDTH] IN BLOOD BY AUTOMATED COUNT: 12 % (ref 11.6–15.1)
GFR SERPL CREATININE-BSD FRML MDRD: 111 ML/MIN/1.73SQ M
GLUCOSE SERPL-MCNC: 102 MG/DL (ref 65–140)
HCT VFR BLD AUTO: 42.2 % (ref 36.5–49.3)
HGB BLD-MCNC: 14.7 G/DL (ref 12–17)
IMM GRANULOCYTES # BLD AUTO: 0.03 THOUSAND/UL (ref 0–0.2)
IMM GRANULOCYTES NFR BLD AUTO: 0 % (ref 0–2)
LYMPHOCYTES # BLD AUTO: 2.79 THOUSANDS/ΜL (ref 0.6–4.47)
LYMPHOCYTES NFR BLD AUTO: 41 % (ref 14–44)
MCH RBC QN AUTO: 29.3 PG (ref 26.8–34.3)
MCHC RBC AUTO-ENTMCNC: 34.8 G/DL (ref 31.4–37.4)
MCV RBC AUTO: 84 FL (ref 82–98)
MONOCYTES # BLD AUTO: 0.51 THOUSAND/ΜL (ref 0.17–1.22)
MONOCYTES NFR BLD AUTO: 8 % (ref 4–12)
NEUTROPHILS # BLD AUTO: 3.3 THOUSANDS/ΜL (ref 1.85–7.62)
NEUTS SEG NFR BLD AUTO: 49 % (ref 43–75)
NRBC BLD AUTO-RTO: 0 /100 WBCS
P AXIS: 53 DEGREES
PLATELET # BLD AUTO: 231 THOUSANDS/UL (ref 149–390)
PMV BLD AUTO: 9.6 FL (ref 8.9–12.7)
POTASSIUM SERPL-SCNC: 4 MMOL/L (ref 3.5–5.3)
PR INTERVAL: 136 MS
PROT SERPL-MCNC: 6.8 G/DL (ref 6.4–8.4)
QRS AXIS: 66 DEGREES
QRSD INTERVAL: 92 MS
QT INTERVAL: 384 MS
QTC INTERVAL: 405 MS
RBC # BLD AUTO: 5.01 MILLION/UL (ref 3.88–5.62)
SODIUM SERPL-SCNC: 136 MMOL/L (ref 135–147)
T WAVE AXIS: 34 DEGREES
TSH SERPL DL<=0.05 MIU/L-ACNC: 1.61 UIU/ML (ref 0.45–4.5)
VENTRICULAR RATE: 67 BPM
WBC # BLD AUTO: 6.76 THOUSAND/UL (ref 4.31–10.16)

## 2025-02-19 PROCEDURE — 71046 X-RAY EXAM CHEST 2 VIEWS: CPT

## 2025-02-19 PROCEDURE — 93005 ELECTROCARDIOGRAM TRACING: CPT

## 2025-02-19 PROCEDURE — 80053 COMPREHEN METABOLIC PANEL: CPT

## 2025-02-19 PROCEDURE — 85025 COMPLETE CBC W/AUTO DIFF WBC: CPT

## 2025-02-19 PROCEDURE — 84484 ASSAY OF TROPONIN QUANT: CPT

## 2025-02-19 PROCEDURE — 99285 EMERGENCY DEPT VISIT HI MDM: CPT

## 2025-02-19 PROCEDURE — 36415 COLL VENOUS BLD VENIPUNCTURE: CPT

## 2025-02-19 PROCEDURE — 93010 ELECTROCARDIOGRAM REPORT: CPT | Performed by: INTERNAL MEDICINE

## 2025-02-19 PROCEDURE — 84443 ASSAY THYROID STIM HORMONE: CPT

## 2025-02-19 PROCEDURE — 83880 ASSAY OF NATRIURETIC PEPTIDE: CPT

## 2025-02-19 NOTE — ED PROVIDER NOTES
Time reflects when diagnosis was documented in both MDM as applicable and the Disposition within this note       Time User Action Codes Description Comment    2/19/2025  5:51 AM Kamron Stanford Add [R06.02] SOB (shortness of breath)           ED Disposition       ED Disposition   Discharge    Condition   Stable    Date/Time   Wed Feb 19, 2025  5:51 AM    Comment   Harrison Ramires discharge to home/self care.                   Assessment & Plan       Medical Decision Making  34-year-old male presents to ED for evaluation of shortness of breath as seen in HPI.  On physical examination patient vital signs stable.  Afebrile.  Nontachycardic.  Normotensive.  Nonhypoxic.  Without respiratory distress.  No murmur.  Normal breath sounds.  No stridor, wheezing, Rales,  Rhonchi.  Extremities well-perfused.  No signs of fluid overload.  Obtained workup consisting of CBC, CMP, BNP, troponin, TSH, EKG, chest x-ray.  Differential diagnosis includes but not limited to sleep apnea, MI, ACS, electrolyte abnormality, anxiety, GERD    CBC WNL.  CMP WNL.  BNP WNL.  Troponin less than 2 NG/L.  TSH WNL.  EKG without evidence of acute cardiac injury, arrhythmia.  Chest x-ray without evidence of acute cardiopulmonary disease.    Discussed results of workup with patient.  Patient had subsequent episode in the ED  similar to described episode at home.  When patient was drifting to sleep, his wife noticed that he stopped breathing for several seconds.  Patient woke up with shortness of breath, palpitations.  This description seems consistent with sleep apnea.  Patient would most benefit with family doctor follow-up and subsequent sleep study to assess for sleep apnea.  Patient should certainly return to ED for new or worsening symptoms.  Patient agreeable to plan.  Patient discharged.     Prior to discharge, discharge instructions were discussed with patient at bedside. Patient was provided both verbal and written instructions. Patient is  "understanding of the discharge instructions and is agreeable to plan of care. Return precautions were discussed with patient bedside, patient verbalized understanding of signs and symptoms that would necessitate return to the ED. All questions were answered. Patient was comfortable with the plan of care and discharged to home.    Portions of this chart may have been written with voice recognition software.  Occasional grammatical errors, wrong word or \"sound a like\" substitutions may have occurred due to software limitations.  Please read carefully and use context to recognize where substitutions have occurred.     Amount and/or Complexity of Data Reviewed  Labs: ordered.  Radiology: ordered.             Medications - No data to display    ED Risk Strat Scores   HEART Risk Score      Flowsheet Row Most Recent Value   Heart Score Risk Calculator    History 0 Filed at: 02/19/2025 0550   ECG 0 Filed at: 02/19/2025 0550   Age 0 Filed at: 02/19/2025 0550   Risk Factors 1 Filed at: 02/19/2025 0550   Troponin 0 Filed at: 02/19/2025 0550   HEART Score 1 Filed at: 02/19/2025 0550          HEART Risk Score      Flowsheet Row Most Recent Value   Heart Score Risk Calculator    History 0 Filed at: 02/19/2025 0550   ECG 0 Filed at: 02/19/2025 0550   Age 0 Filed at: 02/19/2025 0550   Risk Factors 1 Filed at: 02/19/2025 0550   Troponin 0 Filed at: 02/19/2025 0550   HEART Score 1 Filed at: 02/19/2025 0550                              SBIRT 20yo+      Flowsheet Row Most Recent Value   Initial Alcohol Screen: US AUDIT-C     1. How often do you have a drink containing alcohol? 0 Filed at: 02/19/2025 0345   2. How many drinks containing alcohol do you have on a typical day you are drinking?  0 Filed at: 02/19/2025 0345   3a. Male UNDER 65: How often do you have five or more drinks on one occasion? 0 Filed at: 02/19/2025 0345   Audit-C Score 0 Filed at: 02/19/2025 0345   JOSEFINA: How many times in the past year have you...    Used an " "illegal drug or used a prescription medication for non-medical reasons? Never Filed at: 02/19/2025 0345                            History of Present Illness       Chief Complaint   Patient presents with    Shortness of Breath     States since November he has intermittent episodes where he's falling asleep and feels like he stops breathing, \"jolting him awake\". Denies hx of sleep apnea, states he's been following with PCP for same without resolve        Past Medical History:   Diagnosis Date    GERD (gastroesophageal reflux disease)     Hypertension     Lumbar herniated disc     x 3      History reviewed. No pertinent surgical history.   History reviewed. No pertinent family history.   Social History     Tobacco Use    Smoking status: Never    Smokeless tobacco: Current     Types: Chew   Vaping Use    Vaping status: Never Used   Substance Use Topics    Alcohol use: No    Drug use: No      E-Cigarette/Vaping    E-Cigarette Use Never User       E-Cigarette/Vaping Substances      I have reviewed and agree with the history as documented.     34-year-old male with history of GERD, hypertension, near syncopal episodes presents to ED for evaluation of shortness of breath episode.  Patient explains that tonight when attempting to go to sleep, he has been experiencing sensation of shortness of breath, heart palpitations.  He will immediately wake up in distress.  Has been unable to stay asleep for more than a minute tonight.  Has had episode of similar sensation twice since November 2024.  Tonsb's episode was far worse.  Previously he has been able to sleep but tonight he has not been able to.  Does have some life stressors which have been increasing recently but patient does not feel that he has been ruminating on these excessively.  No formal history of sleep apnea.  Patient's wife denies hearing excessive snoring at night.  Has been seeing his PCP, cardiologist, pulmonologist recently since he started experiencing near " syncopal episodes in November 2024.  Had an echo performed in December 2024 which was normal.  Currently in the ED patient denies chest pain, shortness of breath, fever, numbness and tingling of extremities.                Review of Systems   Respiratory:  Positive for shortness of breath.    All other systems reviewed and are negative.          Objective       ED Triage Vitals [02/19/25 0347]   Temperature Pulse Blood Pressure Respirations SpO2 Patient Position - Orthostatic VS   97.7 °F (36.5 °C) 67 132/76 16 100 % Sitting      Temp src Heart Rate Source BP Location FiO2 (%) Pain Score    -- Monitor Left arm -- No Pain      Vitals      Date and Time Temp Pulse SpO2 Resp BP Pain Score FACES Pain Rating User   02/19/25 0347 97.7 °F (36.5 °C) 67 100 % 16 132/76 No Pain -- MADELINE            Physical Exam  Vitals and nursing note reviewed.   Constitutional:       General: He is not in acute distress.     Appearance: He is well-developed. He is not toxic-appearing or diaphoretic.   HENT:      Head: Normocephalic and atraumatic.   Eyes:      Conjunctiva/sclera: Conjunctivae normal.   Cardiovascular:      Rate and Rhythm: Normal rate and regular rhythm.      Heart sounds: No murmur heard.     No friction rub. No gallop.   Pulmonary:      Effort: Pulmonary effort is normal. No accessory muscle usage or respiratory distress.      Breath sounds: Normal breath sounds. No stridor. No decreased breath sounds, wheezing, rhonchi or rales.   Abdominal:      Palpations: Abdomen is soft.      Tenderness: There is no abdominal tenderness.   Musculoskeletal:         General: No swelling.      Cervical back: Neck supple.      Right lower leg: No edema.      Left lower leg: No edema.   Skin:     General: Skin is warm and dry.      Capillary Refill: Capillary refill takes less than 2 seconds.   Neurological:      Mental Status: He is alert.   Psychiatric:         Mood and Affect: Mood normal.         Results Reviewed       Procedure  Component Value Units Date/Time    TSH, 3rd generation with Free T4 reflex [[id number]]  (Normal) Collected: 02/19/25 0451    Lab Status: Final result Specimen: Blood from Arm, Right Updated: 02/19/25 0536     TSH 3RD GENERATON 1.609 uIU/mL     HS Troponin 0hr (reflex protocol) [[id number]]  (Normal) Collected: 02/19/25 0451    Lab Status: Final result Specimen: Blood from Arm, Right Updated: 02/19/25 0527     hs TnI 0hr <2 ng/L     B-Type Natriuretic Peptide(BNP) [[id number]]  (Normal) Collected: 02/19/25 0451    Lab Status: Final result Specimen: Blood from Arm, Right Updated: 02/19/25 0526     BNP 16 pg/mL     Comprehensive metabolic panel [[id number]] Collected: 02/19/25 0451    Lab Status: Final result Specimen: Blood from Arm, Right Updated: 02/19/25 0519     Sodium 136 mmol/L      Potassium 4.0 mmol/L      Chloride 102 mmol/L      CO2 24 mmol/L      ANION GAP 10 mmol/L      BUN 18 mg/dL      Creatinine 0.90 mg/dL      Glucose 102 mg/dL      Calcium 9.4 mg/dL      AST 28 U/L      ALT 48 U/L      Alkaline Phosphatase 58 U/L      Total Protein 6.8 g/dL      Albumin 4.4 g/dL      Total Bilirubin 0.64 mg/dL      eGFR 111 ml/min/1.73sq m     Narrative:      [employer] guidelines for Chronic Kidney Disease (CKD):     Stage 1 with normal or high GFR (GFR > 90 mL/min/1.73 square meters)    Stage 2 Mild CKD (GFR = 60-89 mL/min/1.73 square meters)    Stage 3A Moderate CKD (GFR = 45-59 mL/min/1.73 square meters)    Stage 3B Moderate CKD (GFR = 30-44 mL/min/1.73 square meters)    Stage 4 Severe CKD (GFR = 15-29 mL/min/1.73 square meters)    Stage 5 End Stage CKD (GFR <15 mL/min/1.73 square meters)  Note: GFR calculation is accurate only with a steady state creatinine    CBC and differential [[id number]] Collected: 02/19/25 0451    Lab Status: Final result Specimen: Blood from Arm, Right Updated: 02/19/25 0502     WBC 6.76 Thousand/uL      RBC 5.01 Million/uL      Hemoglobin 14.7 g/dL      Hematocrit  42.2 %      MCV 84 fL      MCH 29.3 pg      MCHC 34.8 g/dL      RDW 12.0 %      MPV 9.6 fL      Platelets 231 Thousands/uL      nRBC 0 /100 WBCs      Segmented % 49 %      Immature Grans % 0 %      Lymphocytes % 41 %      Monocytes % 8 %      Eosinophils Relative 2 %      Basophils Relative 0 %      Absolute Neutrophils 3.30 Thousands/µL      Absolute Immature Grans 0.03 Thousand/uL      Absolute Lymphocytes 2.79 Thousands/µL      Absolute Monocytes 0.51 Thousand/µL      Eosinophils Absolute 0.10 Thousand/µL      Basophils Absolute 0.03 Thousands/µL             XR chest 2 views   Final Interpretation by Mikala Arreola MD (02/19 0542)      No acute cardiopulmonary disease.            Workstation performed: [id number]             ECG 12 Lead Documentation Only    Date/Time: 2/19/2025 3:45 AM    Performed by: Kamron Stanford PA-C  Authorized by: Kamron Stanford PA-C    Indications / Diagnosis:  Shortness of breath  Patient location:  ED  Previous ECG:     Previous ECG:  Compared to current    Similarity:  No change  Interpretation:     Interpretation: normal    Rate:     ECG rate:  67    ECG rate assessment: normal    Rhythm:     Rhythm: sinus rhythm    Ectopy:     Ectopy: none    QRS:     QRS axis:  Normal    QRS intervals:  Normal  Conduction:     Conduction: normal    ST segments:     ST segments:  Normal  T waves:     T waves: normal        ED Medication and Procedure Management   Prior to Admission Medications   Prescriptions Last Dose Informant Patient Reported? Taking?   atenolol (TENORMIN) 25 mg tablet   Yes No   Sig: Take 25 mg by mouth daily   dicyclomine (BENTYL) 20 mg tablet   No No   Sig: Take 1 tablet (20 mg total) by mouth 2 (two) times a day   Patient not taking: Reported on 9/29/2024   omeprazole (PriLOSEC) 20 mg delayed release capsule   Yes No   Sig: Take 20 mg by mouth if needed   ondansetron (ZOFRAN-ODT) 4 mg disintegrating tablet   No No   Sig: Take 1 tablet (4 mg total) by mouth  every 6 (six) hours as needed for nausea or vomiting   Patient not taking: Reported on 9/29/2024      Facility-Administered Medications: None     Discharge Medication List as of 2/19/2025  5:51 AM        CONTINUE these medications which have NOT CHANGED    Details   atenolol (TENORMIN) 25 mg tablet Take 25 mg by mouth daily, Historical Med      dicyclomine (BENTYL) 20 mg tablet Take 1 tablet (20 mg total) by mouth 2 (two) times a day, Starting Tue 6/13/2023, Normal      omeprazole (PriLOSEC) 20 mg delayed release capsule Take 20 mg by mouth if needed, Historical Med      ondansetron (ZOFRAN-ODT) 4 mg disintegrating tablet Take 1 tablet (4 mg total) by mouth every 6 (six) hours as needed for nausea or vomiting, Starting Tue 6/13/2023, Normal           No discharge procedures on file.  ED SEPSIS DOCUMENTATION   Time reflects when diagnosis was documented in both MDM as applicable and the Disposition within this note       Time User Action Codes Description Comment    2/19/2025  5:51 AM Kamron Stanford Add [R06.02] SOB (shortness of breath)                  Kamron Stanford PA-C  02/21/25 0244

## 2025-02-21 ENCOUNTER — EVALUATION (OUTPATIENT)
Dept: PHYSICAL THERAPY | Facility: MEDICAL CENTER | Age: 35
End: 2025-02-21
Payer: COMMERCIAL

## 2025-02-21 DIAGNOSIS — G89.29 CHRONIC MIDLINE LOW BACK PAIN, UNSPECIFIED WHETHER SCIATICA PRESENT: Primary | ICD-10-CM

## 2025-02-21 DIAGNOSIS — M54.50 CHRONIC MIDLINE LOW BACK PAIN, UNSPECIFIED WHETHER SCIATICA PRESENT: Primary | ICD-10-CM

## 2025-02-21 PROCEDURE — 97530 THERAPEUTIC ACTIVITIES: CPT | Performed by: PHYSICAL THERAPIST

## 2025-02-21 PROCEDURE — 97110 THERAPEUTIC EXERCISES: CPT | Performed by: PHYSICAL THERAPIST

## 2025-02-21 PROCEDURE — 97112 NEUROMUSCULAR REEDUCATION: CPT | Performed by: PHYSICAL THERAPIST

## 2025-02-21 NOTE — PROGRESS NOTES
PT Re-Evaluation     Today's date: 2025  Patient name: Harrison Ramires  : 1990  MRN: [id number]  Referring provider: Suman Joe MD  Dx:   Encounter Diagnosis     ICD-10-CM    1. Chronic midline low back pain, unspecified whether sciatica present  M54.50     G89.29           Start Time: 0806  Stop Time: 0856  Total time in clinic (min): 50 minutes    Assessment  Impairments: abnormal muscle tone, abnormal or restricted ROM, abnormal movement, activity intolerance, impaired balance, impaired physical strength, lacks appropriate home exercise program, pain with function, poor posture , participation limitations, activity limitations and endurance  Symptom irritability: low    Assessment details: Pt is a 34 y.o. male who has completed 3 PT sessions to this date.  The patient has made improvements in decreased SPR, improved lumbar ROM and improved activity tolerance.  However, the patient continues to have increased difficulty with continued periods of increased back pain, decreased lumbar extension ROM and decreased activity tolerance when symptoms are elevated. Movement examination was completed with trial of lumbar extension exercises to be completed as part of HEP.  I believe this patient will continue to benefit from skilled PT for continued manual therapy to the L/S, continued lumbar ROM exercises, continued core strengthening exercises and mechanics training in order to further improve symptoms and limitations and assist the patient to improved QOL.          Understanding of Dx/Px/POC: good     Prognosis: good    Goals  STGs: 4 weeks  1) Pt will have SPR decrease of 2 units at worst- met  2) pt will have improved lumbar extension to minimally limited- part met  3) pt will have improved foto score of 10 points- part met    LTGs: 8 weeks  1) pt will be independent with HEP by D/C- part met  2) pt will be independent with symptom management by D/C- part met  3) pt will subjectively report improved  tolerance to performance of work related activity by DC.- part met       Plan  Patient would benefit from: skilled physical therapy  Planned modality interventions: cryotherapy and thermotherapy: hydrocollator packs    Planned therapy interventions: joint mobilization, manual therapy, neuromuscular re-education, patient/caregiver education, postural training, strengthening, stretching, therapeutic activities, therapeutic exercise and home exercise program    Frequency: 1-2x week  Duration in weeks: 12  Plan of Care beginning date: 1/16/2025  Plan of Care expiration date: 4/10/2025  Treatment plan discussed with: patient        Subjective Evaluation    History of Present Illness  Mechanism of injury: Subjective Comments: pt reports that he still has on and off back pain. Reports that he still feels he has increased symptoms since his last flare up. Reports that his back pain has not gone away like it typically does.     Pain   Rest: 1/10   Best: 0/10   Worst: 5/10          Objective     Neurological Testing     Sensation     Lumbar   Left   Intact: light touch    Right   Intact: light touch    Reflexes   Left   Patellar (L4): normal (2+)  Achilles (S1): normal (2+)    Right   Patellar (L4): normal (2+)  Achilles (S1): normal (2+)    Active Range of Motion     Lumbar   Flexion:  WFL  Extension:  Restriction level: moderate  Left lateral flexion:  WFL  Right lateral flexion:  WFL    Passive Range of Motion   Left Hip   Normal passive range of motion  Internal rotation (90/90): 15 degrees     Right Hip   Normal passive range of motion  Internal rotation (90/90): 15 degrees     Strength/Myotome Testing     Left Hip   Planes of Motion   Flexion: 5  Abduction: 5  Adduction: 5    Right Hip   Planes of Motion   Flexion: 5  Abduction: 5  Adduction: 5    Left Knee   Flexion: 5  Extension: 5    Right Knee   Flexion: 5  Extension: 5    Left Ankle/Foot   Dorsiflexion: 5  Plantar flexion: 5  Great toe extension: 5    Right  "Ankle/Foot   Dorsiflexion: 5  Plantar flexion: 5  Great toe extension: 5    Ambulation     Ambulation: Level Surfaces   Ambulation without assistive device: independent    Ambulation: Stairs   Ascend stairs: independent  Pattern: reciprocal  Railings: without rails  Descend stairs: independent  Pattern: reciprocal  Railings: without rails    Observational Gait   Walking speed and stride length within functional limits.              Precautions:   There is no problem list on file for this patient.    Past Medical History:   Diagnosis Date    GERD (gastroesophageal reflux disease)     Hypertension     Lumbar herniated disc     x 3     History reviewed. No pertinent surgical history.  Eval/Re-Eval POC Expires Auth #/ Referral # Total Visits Start Date Expiration Date Extension Info Visits Limitation   1/16 4/10                                                               1 2 3 4 5 6   1/16 1/23 2/21 RE      7 8 9 10 11 12           13 14 15 16 17 18           19 20 21 22 23 24           25 26 27 28 29 30                 Manuals 1/16 1/23 2/21 RE          PA jt mobs L/S             STM L/S  RK RK                                    Neuro Re-Ed             PPT 5\" x10 5\" x10           TAB+ bridges x10 2x10 2x10          TAB + clamshells X10 ea. 2x10 ea. 2x10 ea.          TAB+ SLR X10 ea. 2x10 ea. 2x10 ea.          Dead bugs  X10 alt arms, 2x10 alt legs                                     Ther Ex             bike  5' 5'          PPU?   X10, x10 w/ exhale          HSS  30\"x3 ea. 30\"x3 ea.          Piriformis stretch  30\"x3 ea. 30\"x3 ea.          LTR  X10 ea. X10 ea.                                                 Ther Activity                                       Gait Training                                       Modalities                                              "

## 2025-02-26 ENCOUNTER — OFFICE VISIT (OUTPATIENT)
Dept: PHYSICAL THERAPY | Facility: MEDICAL CENTER | Age: 35
End: 2025-02-26
Payer: COMMERCIAL

## 2025-02-26 DIAGNOSIS — M54.50 CHRONIC MIDLINE LOW BACK PAIN, UNSPECIFIED WHETHER SCIATICA PRESENT: Primary | ICD-10-CM

## 2025-02-26 DIAGNOSIS — G89.29 CHRONIC MIDLINE LOW BACK PAIN, UNSPECIFIED WHETHER SCIATICA PRESENT: Primary | ICD-10-CM

## 2025-02-26 PROCEDURE — 97110 THERAPEUTIC EXERCISES: CPT | Performed by: PHYSICAL THERAPIST

## 2025-02-26 PROCEDURE — 97140 MANUAL THERAPY 1/> REGIONS: CPT | Performed by: PHYSICAL THERAPIST

## 2025-02-26 PROCEDURE — 97112 NEUROMUSCULAR REEDUCATION: CPT | Performed by: PHYSICAL THERAPIST

## 2025-02-26 NOTE — PROGRESS NOTES
Daily Note     Today's date: 2025  Patient name: aHrrison Ramires  : 1990  MRN: [id number]  Referring provider: Suman Joe MD  Dx:   Encounter Diagnosis     ICD-10-CM    1. Chronic midline low back pain, unspecified whether sciatica present  M54.50     G89.29           Start Time: 1730  Stop Time: 1815  Total time in clinic (min): 45 minutes    Subjective: pt reports that his back is tight due to crazy work week. Reports that he has not been consistent with HEP.      Objective: See treatment diary below      Assessment: Tolerated treatment well. Trial of lumbar extension repeated motions was completed with no change in symptoms and pain noted at end range. Trial of repeated lumbar flexion noted no change in symptoms. Pt educated on purpose of repeated motions testing and importance of improving core strength in order to improve symptoms with functional activity. Pt was strongly encouraged to continue core strengthening exercises as part of HEP as tolerated. Progression of core strengthening exercises was completed in standing with good tolerance. We will continue to monitor and progress as tolerated.   Patient would benefit from continued PT      Plan: Continue per plan of care.      Precautions:   There is no problem list on file for this patient.    Past Medical History:   Diagnosis Date    GERD (gastroesophageal reflux disease)     Hypertension     Lumbar herniated disc     x 3     History reviewed. No pertinent surgical history.  Eval/Re-Eval POC Expires Auth #/ Referral # Total Visits Start Date Expiration Date Extension Info Visits Limitation   1/16 4/10                                                               1 2 3 4 5 6    RE      7 8 9 10 11 12           13 14 15 16 17 18           19 20 21 22 23 24           25 26 27 28 29 30                 Manuals  RE          PA jt mobs L/S             STM L/S  RK RK RK                                   Neuro Re-Ed   "           PPT 5\" x10 5\" x10  Standing TA ball press 5\" x10         TAB+ bridges x10 2x10 2x10 2x10         TAB + clamshells X10 ea. 2x10 ea. 2x10 ea. 2x10 ea.         TAB+ SLR X10 ea. 2x10 ea. 2x10 ea. 2x10 ea.         Dead bugs  X10 alt arms, 2x10 alt legs                                     Ther Ex             bike  5' 5' 5'         PPU?   X10, x10 w/ exhale 2x10 PPU         HSS  30\"x3 ea. 30\"x3 ea. 30\"x3 ea.         Piriformis stretch  30\"x3 ea. 30\"x3 ea. 30\"x3 ea.         LTR  X10 ea. X10 ea. X10 ea.                                                Ther Activity                                       Gait Training                                       Modalities                                              "